# Patient Record
Sex: FEMALE | Race: WHITE | NOT HISPANIC OR LATINO | URBAN - METROPOLITAN AREA
[De-identification: names, ages, dates, MRNs, and addresses within clinical notes are randomized per-mention and may not be internally consistent; named-entity substitution may affect disease eponyms.]

---

## 2017-03-31 ENCOUNTER — HOSPITAL ENCOUNTER (OUTPATIENT)
Dept: CT IMAGING | Facility: HOSPITAL | Age: 47
Discharge: HOME | End: 2017-03-31
Admitting: PHYSICIAN ASSISTANT

## 2017-03-31 ENCOUNTER — HOSPITAL ENCOUNTER (OUTPATIENT)
Dept: CT IMAGING | Facility: HOSPITAL | Age: 47
Discharge: HOME | End: 2017-03-31

## 2017-03-31 DIAGNOSIS — R06.81 BREATHLESSNESS ON EXERTION: ICD-10-CM

## 2017-03-31 DIAGNOSIS — R10.11 RIGHT UPPER QUADRANT PAIN: ICD-10-CM

## 2017-03-31 PROCEDURE — 74150 CT ABDOMEN W/O CONTRAST: CPT

## 2017-03-31 PROCEDURE — 0 IOPAMIDOL PER 1 ML: Performed by: PHYSICIAN ASSISTANT

## 2017-03-31 PROCEDURE — 71260 CT THORAX DX C+: CPT

## 2017-03-31 RX ADMIN — IOPAMIDOL 90 ML: 755 INJECTION, SOLUTION INTRAVENOUS at 15:08

## 2017-07-05 ENCOUNTER — TRANSCRIBE ORDERS (OUTPATIENT)
Dept: ADMINISTRATIVE | Facility: HOSPITAL | Age: 47
End: 2017-07-05

## 2017-07-11 ENCOUNTER — TRANSCRIBE ORDERS (OUTPATIENT)
Dept: MAMMOGRAPHY | Facility: HOSPITAL | Age: 47
End: 2017-07-11

## 2017-07-11 DIAGNOSIS — N63.0 BREAST MASS: Primary | ICD-10-CM

## 2017-07-26 ENCOUNTER — TRANSCRIBE ORDERS (OUTPATIENT)
Dept: MAMMOGRAPHY | Facility: HOSPITAL | Age: 47
End: 2017-07-26

## 2017-07-26 ENCOUNTER — HOSPITAL ENCOUNTER (OUTPATIENT)
Dept: MAMMOGRAPHY | Facility: HOSPITAL | Age: 47
Discharge: HOME | End: 2017-07-26
Attending: OBSTETRICS & GYNECOLOGY | Admitting: OBSTETRICS & GYNECOLOGY

## 2017-07-26 ENCOUNTER — HOSPITAL ENCOUNTER (OUTPATIENT)
Dept: ULTRASOUND IMAGING | Facility: HOSPITAL | Age: 47
Discharge: HOME | End: 2017-07-26

## 2017-07-26 DIAGNOSIS — N63.0 BREAST MASS: ICD-10-CM

## 2017-07-26 DIAGNOSIS — R92.8 ABNORMAL MAMMOGRAM: Primary | ICD-10-CM

## 2017-07-26 PROCEDURE — G0279 TOMOSYNTHESIS, MAMMO: HCPCS

## 2017-07-26 PROCEDURE — 76642 ULTRASOUND BREAST LIMITED: CPT

## 2017-07-26 PROCEDURE — 77066 DX MAMMO INCL CAD BI: CPT | Performed by: RADIOLOGY

## 2017-07-26 PROCEDURE — G0204 DX MAMMO INCL CAD BI: HCPCS

## 2017-07-26 PROCEDURE — 77062 BREAST TOMOSYNTHESIS BI: CPT | Performed by: RADIOLOGY

## 2017-07-26 PROCEDURE — 76642 ULTRASOUND BREAST LIMITED: CPT | Performed by: RADIOLOGY

## 2018-02-01 ENCOUNTER — HOSPITAL ENCOUNTER (OUTPATIENT)
Dept: MAMMOGRAPHY | Facility: HOSPITAL | Age: 48
Discharge: HOME | End: 2018-02-01
Attending: OBSTETRICS & GYNECOLOGY | Admitting: OBSTETRICS & GYNECOLOGY

## 2018-02-01 DIAGNOSIS — R92.8 ABNORMAL MAMMOGRAM: ICD-10-CM

## 2018-02-01 PROCEDURE — 77062 BREAST TOMOSYNTHESIS BI: CPT | Performed by: RADIOLOGY

## 2018-02-01 PROCEDURE — 77066 DX MAMMO INCL CAD BI: CPT | Performed by: RADIOLOGY

## 2018-02-01 PROCEDURE — G0279 TOMOSYNTHESIS, MAMMO: HCPCS

## 2018-02-01 PROCEDURE — 77066 DX MAMMO INCL CAD BI: CPT

## 2019-01-21 ENCOUNTER — TRANSCRIBE ORDERS (OUTPATIENT)
Dept: ADMINISTRATIVE | Facility: HOSPITAL | Age: 49
End: 2019-01-21

## 2019-01-21 DIAGNOSIS — Z12.31 VISIT FOR SCREENING MAMMOGRAM: Primary | ICD-10-CM

## 2019-03-08 ENCOUNTER — HOSPITAL ENCOUNTER (OUTPATIENT)
Dept: MAMMOGRAPHY | Facility: HOSPITAL | Age: 49
Discharge: HOME | End: 2019-03-08
Attending: OBSTETRICS & GYNECOLOGY | Admitting: OBSTETRICS & GYNECOLOGY

## 2019-03-08 DIAGNOSIS — Z12.31 VISIT FOR SCREENING MAMMOGRAM: ICD-10-CM

## 2019-03-08 PROCEDURE — 77063 BREAST TOMOSYNTHESIS BI: CPT

## 2019-03-08 PROCEDURE — 77067 SCR MAMMO BI INCL CAD: CPT | Performed by: RADIOLOGY

## 2019-03-08 PROCEDURE — 77063 BREAST TOMOSYNTHESIS BI: CPT | Performed by: RADIOLOGY

## 2019-03-08 PROCEDURE — 77067 SCR MAMMO BI INCL CAD: CPT

## 2019-09-06 ENCOUNTER — OFFICE VISIT (OUTPATIENT)
Dept: INTERNAL MEDICINE | Facility: CLINIC | Age: 49
End: 2019-09-06

## 2019-09-06 VITALS
HEART RATE: 58 BPM | DIASTOLIC BLOOD PRESSURE: 58 MMHG | WEIGHT: 187 LBS | HEIGHT: 72 IN | BODY MASS INDEX: 25.33 KG/M2 | SYSTOLIC BLOOD PRESSURE: 100 MMHG | OXYGEN SATURATION: 99 %

## 2019-09-06 DIAGNOSIS — Z13.1 SCREENING FOR DIABETES MELLITUS: ICD-10-CM

## 2019-09-06 DIAGNOSIS — J30.0 VASOMOTOR RHINITIS: ICD-10-CM

## 2019-09-06 DIAGNOSIS — F45.8 BRUXISM: ICD-10-CM

## 2019-09-06 DIAGNOSIS — J32.9 CHRONIC SINUSITIS, UNSPECIFIED LOCATION: ICD-10-CM

## 2019-09-06 DIAGNOSIS — I34.1 MITRAL VALVE PROLAPSE: ICD-10-CM

## 2019-09-06 DIAGNOSIS — M25.551 PAIN OF RIGHT HIP JOINT: Primary | ICD-10-CM

## 2019-09-06 DIAGNOSIS — G43.909 MIGRAINE WITHOUT STATUS MIGRAINOSUS, NOT INTRACTABLE, UNSPECIFIED MIGRAINE TYPE: ICD-10-CM

## 2019-09-06 DIAGNOSIS — M85.852 OSTEOPENIA OF LEFT HIP: ICD-10-CM

## 2019-09-06 DIAGNOSIS — Z13.29 SCREENING FOR THYROID DISORDER: ICD-10-CM

## 2019-09-06 DIAGNOSIS — Z00.00 HEALTHCARE MAINTENANCE: ICD-10-CM

## 2019-09-06 DIAGNOSIS — E55.9 VITAMIN D DEFICIENCY: ICD-10-CM

## 2019-09-06 DIAGNOSIS — I87.1 MAY-THURNER SYNDROME: ICD-10-CM

## 2019-09-06 DIAGNOSIS — Z13.220 SCREENING, LIPID: ICD-10-CM

## 2019-09-06 LAB
ALBUMIN SERPL-MCNC: 4.5 G/DL (ref 3.5–5.2)
ALBUMIN/GLOB SERPL: 1.7 G/DL
ALP SERPL-CCNC: 56 U/L (ref 39–117)
ALT SERPL W P-5'-P-CCNC: 11 U/L (ref 1–33)
ANION GAP SERPL CALCULATED.3IONS-SCNC: 9.9 MMOL/L (ref 5–15)
AST SERPL-CCNC: 20 U/L (ref 1–32)
BILIRUB SERPL-MCNC: 0.4 MG/DL (ref 0.2–1.2)
BUN BLD-MCNC: 8 MG/DL (ref 6–20)
BUN/CREAT SERPL: 10 (ref 7–25)
CALCIUM SPEC-SCNC: 9.2 MG/DL (ref 8.6–10.5)
CHLORIDE SERPL-SCNC: 101 MMOL/L (ref 98–107)
CHOLEST SERPL-MCNC: 183 MG/DL (ref 0–200)
CO2 SERPL-SCNC: 28.1 MMOL/L (ref 22–29)
CREAT BLD-MCNC: 0.8 MG/DL (ref 0.57–1)
DEPRECATED RDW RBC AUTO: 43.8 FL (ref 37–54)
ERYTHROCYTE [DISTWIDTH] IN BLOOD BY AUTOMATED COUNT: 12.6 % (ref 12.3–15.4)
GFR SERPL CREATININE-BSD FRML MDRD: 77 ML/MIN/1.73
GLOBULIN UR ELPH-MCNC: 2.6 GM/DL
GLUCOSE BLD-MCNC: 76 MG/DL (ref 65–99)
HBA1C MFR BLD: 5.2 % (ref 4.8–5.6)
HCT VFR BLD AUTO: 45.1 % (ref 34–46.6)
HDLC SERPL-MCNC: 56 MG/DL (ref 40–60)
HGB BLD-MCNC: 14.4 G/DL (ref 12–15.9)
LDLC SERPL CALC-MCNC: 114 MG/DL (ref 0–100)
LDLC/HDLC SERPL: 2.04 {RATIO}
MCH RBC QN AUTO: 29.9 PG (ref 26.6–33)
MCHC RBC AUTO-ENTMCNC: 31.9 G/DL (ref 31.5–35.7)
MCV RBC AUTO: 93.6 FL (ref 79–97)
PLATELET # BLD AUTO: 130 10*3/MM3 (ref 140–450)
PMV BLD AUTO: 12.7 FL (ref 6–12)
POTASSIUM BLD-SCNC: 4.6 MMOL/L (ref 3.5–5.2)
PROT SERPL-MCNC: 7.1 G/DL (ref 6–8.5)
RBC # BLD AUTO: 4.82 10*6/MM3 (ref 3.77–5.28)
SODIUM BLD-SCNC: 139 MMOL/L (ref 136–145)
TRIGL SERPL-MCNC: 65 MG/DL (ref 0–150)
TSH SERPL DL<=0.05 MIU/L-ACNC: 0.92 UIU/ML (ref 0.27–4.2)
VLDLC SERPL-MCNC: 13 MG/DL (ref 5–40)
WBC NRBC COR # BLD: 4.67 10*3/MM3 (ref 3.4–10.8)

## 2019-09-06 PROCEDURE — 83036 HEMOGLOBIN GLYCOSYLATED A1C: CPT | Performed by: INTERNAL MEDICINE

## 2019-09-06 PROCEDURE — 85027 COMPLETE CBC AUTOMATED: CPT | Performed by: INTERNAL MEDICINE

## 2019-09-06 PROCEDURE — 99204 OFFICE O/P NEW MOD 45 MIN: CPT | Performed by: INTERNAL MEDICINE

## 2019-09-06 PROCEDURE — 82306 VITAMIN D 25 HYDROXY: CPT | Performed by: INTERNAL MEDICINE

## 2019-09-06 PROCEDURE — 80053 COMPREHEN METABOLIC PANEL: CPT | Performed by: INTERNAL MEDICINE

## 2019-09-06 PROCEDURE — 80061 LIPID PANEL: CPT | Performed by: INTERNAL MEDICINE

## 2019-09-06 PROCEDURE — 84443 ASSAY THYROID STIM HORMONE: CPT | Performed by: INTERNAL MEDICINE

## 2019-09-06 RX ORDER — IPRATROPIUM BROMIDE 21 UG/1
2 SPRAY, METERED NASAL DAILY PRN
COMMUNITY

## 2019-09-06 RX ORDER — MELOXICAM 15 MG/1
15 TABLET ORAL DAILY
Qty: 30 TABLET | Refills: 5 | Status: SHIPPED | OUTPATIENT
Start: 2019-09-06 | End: 2019-09-10 | Stop reason: SINTOL

## 2019-09-06 RX ORDER — LORATADINE 10 MG/1
10 TABLET ORAL EVERY OTHER DAY
COMMUNITY

## 2019-09-06 RX ORDER — SPIRONOLACTONE 50 MG/1
50 TABLET, FILM COATED ORAL 2 TIMES DAILY
Refills: 1 | COMMUNITY
Start: 2019-09-01

## 2019-09-06 NOTE — PROGRESS NOTES
Internal Medicine New Patient  Emiliana Garcia is a 49 y.o. female who presents today to establish care and with concerns as outlined below.    Chief Complaint  Chief Complaint   Patient presents with   • Establish Care   • Hip Pain        HPI  She is a 49y F with May-Thurner syndrome causing RLE swelling, mitral valve prolapse, patello femoral syndrome, sinusitis, vasomotor rhinitis, osteopenia, misophonia who presents today to establish care and have annual exam. She was previously a patient of Dr. Mead. Also sees Dr. Beckford for ENT, Dr. Hathaway for OB/GYN, Dr. Cooley for dermatology.    She walks half marathons twice a year, walks most days of the week at least 4 miles when training for the half marathon. Also has gym equipment at home but not routinely used.    She has constant right hip pain for the past 1.5 years, worse with prolonged sitting and better with walking. Works at desk job. Pain is described as a pressure like aching in the back of the hip, feels as if the hip is displaced. Had an episode of back pain after the [employer] [place] about 6 months ago. Saw a chiropractor a few times which helped the back pain but hip pain was not relieved. Did try stretches at home. Is sleeping with pillow between her legs but still waking up with hip pain. No weakness. Has tried flexeril prescribed by last PCP which makes her sleep but did not necessarily help her pain.         Review of Systems  Review of Systems   Constitutional: Positive for fatigue. Negative for appetite change, chills, diaphoresis, fever, unexpected weight gain and unexpected weight loss.   HENT: Negative for ear pain, hearing loss and sore throat.    Eyes: Positive for redness. Negative for visual disturbance.   Respiratory: Negative for cough, shortness of breath and wheezing.    Cardiovascular: Positive for leg swelling. Negative for chest pain and palpitations.   Gastrointestinal: Negative for abdominal pain, blood in stool, constipation,  diarrhea, nausea, vomiting and GERD.   Endocrine: Positive for heat intolerance and polydipsia. Negative for polyuria.   Genitourinary: Negative for decreased urine volume, difficulty urinating, dysuria, frequency, hematuria and urgency.   Musculoskeletal: Positive for arthralgias and myalgias. Negative for gait problem and joint swelling.   Skin: Negative for rash.   Neurological: Positive for headache. Negative for weakness and numbness.        Past Medical History  Past Medical History:   Diagnosis Date   • Fractures    • May-Thurner syndrome    • Migraine    • Mitral valve prolapse    • Osteopenia    • Vasomotor rhinitis         Surgical History  Past Surgical History:   Procedure Laterality Date   • AUGMENTATION MAMMAPLASTY      2012   • WRIST FRACTURE SURGERY Left 2016        Family History  Family History   Problem Relation Age of Onset   • Hypertension Father    • Alzheimer's disease Father    • Prostate cancer Father    • Skin cancer Father         melanoma   • Thyroid disease Brother    • Osteoporosis Maternal Grandmother    • Alzheimer's disease Maternal Grandmother    • Stroke Maternal Grandfather    • Heart attack Paternal Grandmother    • Hypertension Paternal Grandmother    • Lupus Mother         skin involvement   • Lupus Sister    • Breast cancer Neg Hx    • Ovarian cancer Neg Hx         Social History  Social History     Socioeconomic History   • Marital status:      Spouse name: Not on file   • Number of children: Not on file   • Years of education: Not on file   • Highest education level: Not on file   Tobacco Use   • Smoking status: Never Smoker   • Smokeless tobacco: Never Used   Substance and Sexual Activity   • Alcohol use: No     Frequency: Never   • Drug use: No        Current Medications  Current Outpatient Medications on File Prior to Visit   Medication Sig Dispense Refill   • Cholecalciferol (VITAMIN D PO) Take 2,000 Units by mouth 2 (Two) Times a Day.     •  "Glucosamine-Chondroitin (GLUCOSAMINE CHONDR COMPLEX PO) Take 1 tablet by mouth Daily.     • ipratropium (ATROVENT) 0.03 % nasal spray 2 sprays into the nostril(s) as directed by provider Daily As Needed.     • loratadine (CLARITIN) 10 MG tablet Take 10 mg by mouth Every Other Day.     • Montelukast Sodium (SINGULAIR PO) Take 10 mg by mouth Every Other Day.     • MULTIPLE VITAMIN PO Multiple Vitamin capsule     • NASAL SALINE NA into the nostril(s) as directed by provider.     • spironolactone (ALDACTONE) 50 MG tablet   1     No current facility-administered medications on file prior to visit.        Allergies  Allergies   Allergen Reactions   • Bactrim [Sulfamethoxazole-Trimethoprim] Itching     All over itching, chills, jittery   • Erythromycin Other (See Comments)     Stomach pains, rash   • Lorabid [Loracarbef] Other (See Comments)     Stomach pains, rash     • Penicillins Other (See Comments)     Allergy unknown, happened as a child        Objective  Visit Vitals  /58 (BP Location: Left arm, Patient Position: Sitting)   Pulse 58   Ht 185.4 cm (73\")   Wt 84.8 kg (187 lb)   SpO2 99%   BMI 24.67 kg/m²        Physical Exam  Physical Exam   Constitutional: She is oriented to person, place, and time. She appears well-developed and well-nourished. No distress.   Tall and fit.   HENT:   Head: Normocephalic and atraumatic.   Right Ear: External ear normal.   Left Ear: External ear normal.   Nose: Nose normal.   Mouth/Throat: Oropharynx is clear and moist. No oropharyngeal exudate.   Eyes: Conjunctivae and EOM are normal. Pupils are equal, round, and reactive to light. No scleral icterus.   Neck: Neck supple.   Cardiovascular: Normal rate, regular rhythm, normal heart sounds and intact distal pulses.   No murmur (unable to appreciate mitral valve prolapse) heard.  Pulmonary/Chest: Effort normal and breath sounds normal. No respiratory distress. She has no wheezes.   Abdominal: Soft. Bowel sounds are normal. She " exhibits no distension. There is no tenderness.   Musculoskeletal: She exhibits tenderness (over palpation of right greater trochanter and SI joint.). She exhibits no edema or deformity.   Increase in right hip pain with abduction of hip. Negative straight leg raise. Strength intact.   Lymphadenopathy:     She has no cervical adenopathy.   Neurological: She is alert and oriented to person, place, and time.   Skin: Skin is warm and dry. No rash noted. She is not diaphoretic.   Psychiatric: She has a normal mood and affect. Her behavior is normal. Judgment and thought content normal.   Nursing note and vitals reviewed.       Results  Results for orders placed or performed in visit on 09/06/19   Comprehensive Metabolic Panel   Result Value Ref Range    Glucose 76 65 - 99 mg/dL    BUN 8 6 - 20 mg/dL    Creatinine 0.80 0.57 - 1.00 mg/dL    Sodium 139 136 - 145 mmol/L    Potassium 4.6 3.5 - 5.2 mmol/L    Chloride 101 98 - 107 mmol/L    CO2 28.1 22.0 - 29.0 mmol/L    Calcium 9.2 8.6 - 10.5 mg/dL    Total Protein 7.1 6.0 - 8.5 g/dL    Albumin 4.50 3.50 - 5.20 g/dL    ALT (SGPT) 11 1 - 33 U/L    AST (SGOT) 20 1 - 32 U/L    Alkaline Phosphatase 56 39 - 117 U/L    Total Bilirubin 0.4 0.2 - 1.2 mg/dL    eGFR Non African Amer 77 >60 mL/min/1.73    Globulin 2.6 gm/dL    A/G Ratio 1.7 g/dL    BUN/Creatinine Ratio 10.0 7.0 - 25.0    Anion Gap 9.9 5.0 - 15.0 mmol/L   CBC (No Diff)   Result Value Ref Range    WBC 4.67 3.40 - 10.80 10*3/mm3    RBC 4.82 3.77 - 5.28 10*6/mm3    Hemoglobin 14.4 12.0 - 15.9 g/dL    Hematocrit 45.1 34.0 - 46.6 %    MCV 93.6 79.0 - 97.0 fL    MCH 29.9 26.6 - 33.0 pg    MCHC 31.9 31.5 - 35.7 g/dL    RDW 12.6 12.3 - 15.4 %    RDW-SD 43.8 37.0 - 54.0 fl    MPV 12.7 (H) 6.0 - 12.0 fL    Platelets 130 (L) 140 - 450 10*3/mm3   TSH Rfx On Abnormal To Free T4   Result Value Ref Range    TSH 0.923 0.270 - 4.200 uIU/mL   Lipid Panel   Result Value Ref Range    Total Cholesterol 183 0 - 200 mg/dL    Triglycerides 65 0  - 150 mg/dL    HDL Cholesterol 56 40 - 60 mg/dL    LDL Cholesterol  114 (H) 0 - 100 mg/dL    VLDL Cholesterol 13 5 - 40 mg/dL    LDL/HDL Ratio 2.04    Vitamin D 25 Hydroxy   Result Value Ref Range    25 Hydroxy, Vitamin D >120.0 (H) 30.0 - 100.0 ng/ml   Hemoglobin A1c   Result Value Ref Range    Hemoglobin A1C 5.20 4.80 - 5.60 %        Assessment and Plan  Emiliana was seen today for establish care and hip pain.    Diagnoses and all orders for this visit:    Pain of right hip joint  - Exam with tenderness to palpation over great trochanteric bursa as well as SI joint, increased pain with abduction, negative straight leg raise, intact strength  - Given hx of May-Thurner Syndrome considered claudication due to aortoiliac insufficiency but pain improves with activity and worse with sitting  - Most likely some component of bursitis from prolonged sitting at work which would explain exam except for SI tenderness which may be OA  - Will treat with short term meloxicam, PT, avoidance of prolonged sitting and cushioning when sitting  - If no improvement with conservative treatment will consider imaging of hip    Osteopenia of left hip  - Last BMD 11/2017 with osteopenia and worsening of T score at left hip to -2.4, FRAX 6.4% and 1.4%.  - On vit D and MVI  - Repeat DEXA ordered    Migraine without status migrainosus, not intractable, unspecified migraine type  - No current concerns, well controlled with PRN medications    Mitral valve prolapse  - Review of outside records revealed mild mitral valve prolapse  - No symptoms currently  - Not followed with regular echos and given mild nature, asymptomatic will defer at this time but consider at next visit    May-Thurner syndrome  - Affecting LLE, no history of DVT and no signs of clot today  - Continue to monitor    Vasomotor rhinitis  - Follows with ENT and on ipratropium nasal spray    Chronic sinusitis, unspecified location  - Follows with an ENT and takes claritin, singulair,  nasal saline    Bruxism  - Uses bite guard provided by her dentist    Vitamin D deficiency  - Vitamin D level checked, elevated without evidence of hypercalcemia or liver injury  - Will advise her to hold her vitamin D supplement and repeat testing in 3 months    Healthcare maintenance  - CBC and CMP    Screening for thyroid disorder  - TSH normal    Screening, lipid  - Lipids ordered    Screening for diabetes mellitus  - A1c ordered    Health Maintenance   Topic Date Due   • ANNUAL PHYSICAL  08/13/1975   • INFLUENZA VACCINE  08/01/2019   • PAP SMEAR  09/06/2019   • DXA SCAN  09/06/2019   • TDAP/TD VACCINES (2 - Td) 10/10/2022     Health Maintenance  - Pap smear: Last 2/2019 at GYN, ASCUS HPV negative. Repeat cotesting in 3 years.  - Mammogram: 3/2019 BIRADS 2  - Colonoscopy: Not yet indicated.  - Immunizations: Declined flu vaccine today. Tdap UTD.    Return in about 1 year (around 9/6/2020) for Annual.

## 2019-09-07 LAB — 25(OH)D3 SERPL-MCNC: >120 NG/ML (ref 30–100)

## 2019-09-09 PROBLEM — I34.1 MITRAL VALVE PROLAPSE: Status: ACTIVE | Noted: 2019-09-09

## 2019-09-09 PROBLEM — E55.9 VITAMIN D DEFICIENCY: Status: ACTIVE | Noted: 2019-09-09

## 2019-09-09 PROBLEM — I87.1 MAY-THURNER SYNDROME: Status: ACTIVE | Noted: 2019-09-09

## 2019-09-09 PROBLEM — M25.551 PAIN OF RIGHT HIP JOINT: Status: ACTIVE | Noted: 2019-09-09

## 2019-09-09 PROBLEM — F45.8 BRUXISM: Status: ACTIVE | Noted: 2019-09-09

## 2019-09-09 PROBLEM — M85.852 OSTEOPENIA OF LEFT HIP: Status: ACTIVE | Noted: 2019-09-09

## 2019-09-09 PROBLEM — G43.909 MIGRAINE WITHOUT STATUS MIGRAINOSUS, NOT INTRACTABLE: Status: ACTIVE | Noted: 2019-09-09

## 2019-09-09 PROBLEM — J32.9 CHRONIC SINUSITIS: Status: ACTIVE | Noted: 2019-09-09

## 2019-09-09 PROBLEM — D69.6 THROMBOCYTOPENIA (HCC): Status: ACTIVE | Noted: 2019-09-09

## 2019-09-09 PROBLEM — J30.0 VASOMOTOR RHINITIS: Status: ACTIVE | Noted: 2019-09-09

## 2019-09-10 ENCOUNTER — TELEPHONE (OUTPATIENT)
Dept: INTERNAL MEDICINE | Facility: CLINIC | Age: 49
End: 2019-09-10

## 2019-09-10 NOTE — TELEPHONE ENCOUNTER
----- Message from Saloni Hall MD sent at 9/9/2019  9:23 AM EDT -----  Please call Ms. Garcia and let her know that her labs show an elevated vitamin D level. It likely has not caused her any harm but is something that we need to correct. She should stop her daily supplement for now and we will recheck it in 3 months. She can return to the clinic to get the lab but does not need an appointment to see me. Also her platelets are a little low but looking at her records from Dr. Sanchez this appears to be an issue she has had in the past and it is stable.

## 2019-09-10 NOTE — TELEPHONE ENCOUNTER
Called patient to notify her of your message. She states that she has a skin rash on the back of her neck along the hair line and fells it is a side effect from the Meloxicam. She has quit taking the medication and wants to know if there is another medication she can try.    Please avdise

## 2019-09-11 NOTE — TELEPHONE ENCOUNTER
There are other medications in the same class as meloxicam (NSAIDs) but they may result in the same rash. Has she taken ibuprofen before without rash? If she would rather avoid another systemic NSAID we could try a topical NSAID applied locally to the area. Let me know if she would like to try this and I will order it. Thank you!

## 2019-10-28 ENCOUNTER — HOSPITAL ENCOUNTER (OUTPATIENT)
Dept: BONE DENSITY | Facility: HOSPITAL | Age: 49
Discharge: HOME | End: 2019-10-28
Admitting: INTERNAL MEDICINE

## 2019-10-28 DIAGNOSIS — M85.852 OSTEOPENIA OF LEFT HIP: ICD-10-CM

## 2019-10-28 PROCEDURE — 77080 DXA BONE DENSITY AXIAL: CPT

## 2019-11-26 ENCOUNTER — APPOINTMENT (OUTPATIENT)
Dept: BONE DENSITY | Facility: HOSPITAL | Age: 49
End: 2019-11-26

## 2019-12-09 ENCOUNTER — TELEPHONE (OUTPATIENT)
Dept: INTERNAL MEDICINE | Facility: CLINIC | Age: 49
End: 2019-12-09

## 2019-12-09 NOTE — TELEPHONE ENCOUNTER
PT CALLED TO SAY THAT SHE IS SUPPOSED TO COME IN FOR LABS BUT THERE IS NO ORDER IN HER CHART    PLEASE PUT ORDER IN CHART AND CALL THE PT   PTS NUMBER [id number]

## 2019-12-10 DIAGNOSIS — D69.6 THROMBOCYTOPENIA (HCC): ICD-10-CM

## 2019-12-10 DIAGNOSIS — E67.3 HYPERVITAMINOSIS D: Primary | ICD-10-CM

## 2019-12-12 ENCOUNTER — LAB (OUTPATIENT)
Dept: INTERNAL MEDICINE | Facility: CLINIC | Age: 49
End: 2019-12-12

## 2019-12-12 DIAGNOSIS — D69.6 THROMBOCYTOPENIA (HCC): ICD-10-CM

## 2019-12-12 DIAGNOSIS — E67.3 HYPERVITAMINOSIS D: ICD-10-CM

## 2019-12-12 LAB
25(OH)D3 SERPL-MCNC: 56 NG/ML (ref 30–100)
BASOPHILS # BLD AUTO: 0.01 10*3/MM3 (ref 0–0.2)
BASOPHILS NFR BLD AUTO: 0.2 % (ref 0–1.5)
DEPRECATED RDW RBC AUTO: 41.5 FL (ref 37–54)
EOSINOPHIL # BLD AUTO: 0.05 10*3/MM3 (ref 0–0.4)
EOSINOPHIL NFR BLD AUTO: 1.2 % (ref 0.3–6.2)
ERYTHROCYTE [DISTWIDTH] IN BLOOD BY AUTOMATED COUNT: 12.6 % (ref 12.3–15.4)
HCT VFR BLD AUTO: 42.3 % (ref 34–46.6)
HGB BLD-MCNC: 14 G/DL (ref 12–15.9)
IMM GRANULOCYTES # BLD AUTO: 0.01 10*3/MM3 (ref 0–0.05)
IMM GRANULOCYTES NFR BLD AUTO: 0.2 % (ref 0–0.5)
LYMPHOCYTES # BLD AUTO: 1.22 10*3/MM3 (ref 0.7–3.1)
LYMPHOCYTES NFR BLD AUTO: 29.3 % (ref 19.6–45.3)
MCH RBC QN AUTO: 29.9 PG (ref 26.6–33)
MCHC RBC AUTO-ENTMCNC: 33.1 G/DL (ref 31.5–35.7)
MCV RBC AUTO: 90.2 FL (ref 79–97)
MONOCYTES # BLD AUTO: 0.35 10*3/MM3 (ref 0.1–0.9)
MONOCYTES NFR BLD AUTO: 8.4 % (ref 5–12)
NEUTROPHILS # BLD AUTO: 2.53 10*3/MM3 (ref 1.7–7)
NEUTROPHILS NFR BLD AUTO: 60.7 % (ref 42.7–76)
NRBC BLD AUTO-RTO: 0 /100 WBC (ref 0–0.2)
PLATELET # BLD AUTO: 162 10*3/MM3 (ref 140–450)
PMV BLD AUTO: 12 FL (ref 6–12)
RBC # BLD AUTO: 4.69 10*6/MM3 (ref 3.77–5.28)
WBC NRBC COR # BLD: 4.17 10*3/MM3 (ref 3.4–10.8)

## 2019-12-12 PROCEDURE — 85025 COMPLETE CBC W/AUTO DIFF WBC: CPT | Performed by: INTERNAL MEDICINE

## 2019-12-12 PROCEDURE — 82306 VITAMIN D 25 HYDROXY: CPT | Performed by: INTERNAL MEDICINE

## 2020-02-13 ENCOUNTER — TRANSCRIBE ORDERS (OUTPATIENT)
Dept: ADMINISTRATIVE | Facility: HOSPITAL | Age: 50
End: 2020-02-13

## 2020-02-13 DIAGNOSIS — Z12.31 VISIT FOR SCREENING MAMMOGRAM: Primary | ICD-10-CM

## 2020-02-28 ENCOUNTER — LAB (OUTPATIENT)
Dept: LAB | Facility: HOSPITAL | Age: 50
End: 2020-02-28

## 2020-02-28 ENCOUNTER — TRANSCRIBE ORDERS (OUTPATIENT)
Dept: LAB | Facility: HOSPITAL | Age: 50
End: 2020-02-28

## 2020-02-28 DIAGNOSIS — N95.1 SYMPTOMATIC MENOPAUSAL OR FEMALE CLIMACTERIC STATES: Primary | ICD-10-CM

## 2020-02-28 DIAGNOSIS — N95.1 SYMPTOMATIC MENOPAUSAL OR FEMALE CLIMACTERIC STATES: ICD-10-CM

## 2020-02-28 LAB
ESTRADIOL SERPL HS-MCNC: 23 PG/ML
FSH SERPL-ACNC: 45.6 MIU/ML
PROGEST SERPL-MCNC: 0.15 NG/ML
TESTOST SERPL-MCNC: 4.88 NG/DL (ref 8.4–48.1)
TSH SERPL DL<=0.05 MIU/L-ACNC: 0.75 UIU/ML (ref 0.27–4.2)

## 2020-02-28 PROCEDURE — 84144 ASSAY OF PROGESTERONE: CPT

## 2020-02-28 PROCEDURE — 84403 ASSAY OF TOTAL TESTOSTERONE: CPT

## 2020-02-28 PROCEDURE — 82670 ASSAY OF TOTAL ESTRADIOL: CPT

## 2020-02-28 PROCEDURE — 83001 ASSAY OF GONADOTROPIN (FSH): CPT

## 2020-02-28 PROCEDURE — 36415 COLL VENOUS BLD VENIPUNCTURE: CPT

## 2020-02-28 PROCEDURE — 84443 ASSAY THYROID STIM HORMONE: CPT

## 2020-04-27 ENCOUNTER — APPOINTMENT (OUTPATIENT)
Dept: MAMMOGRAPHY | Facility: HOSPITAL | Age: 50
End: 2020-04-27

## 2020-07-15 ENCOUNTER — HOSPITAL ENCOUNTER (OUTPATIENT)
Dept: MAMMOGRAPHY | Facility: HOSPITAL | Age: 50
Discharge: HOME | End: 2020-07-15
Admitting: OBSTETRICS & GYNECOLOGY

## 2020-07-15 DIAGNOSIS — Z12.31 VISIT FOR SCREENING MAMMOGRAM: ICD-10-CM

## 2020-07-15 PROCEDURE — 77063 BREAST TOMOSYNTHESIS BI: CPT

## 2020-07-15 PROCEDURE — 77067 SCR MAMMO BI INCL CAD: CPT

## 2020-07-15 PROCEDURE — 77063 BREAST TOMOSYNTHESIS BI: CPT | Performed by: RADIOLOGY

## 2020-07-15 PROCEDURE — 77067 SCR MAMMO BI INCL CAD: CPT | Performed by: RADIOLOGY

## 2020-09-08 ENCOUNTER — LAB (OUTPATIENT)
Dept: LAB | Facility: HOSPITAL | Age: 50
End: 2020-09-08

## 2020-09-08 ENCOUNTER — OFFICE VISIT (OUTPATIENT)
Dept: INTERNAL MEDICINE | Facility: CLINIC | Age: 50
End: 2020-09-08

## 2020-09-08 VITALS
TEMPERATURE: 96.9 F | BODY MASS INDEX: 25.95 KG/M2 | HEART RATE: 69 BPM | SYSTOLIC BLOOD PRESSURE: 124 MMHG | DIASTOLIC BLOOD PRESSURE: 82 MMHG | HEIGHT: 72 IN | OXYGEN SATURATION: 99 % | WEIGHT: 191.6 LBS

## 2020-09-08 DIAGNOSIS — E55.9 VITAMIN D DEFICIENCY: ICD-10-CM

## 2020-09-08 DIAGNOSIS — Z00.00 ANNUAL PHYSICAL EXAM: Primary | ICD-10-CM

## 2020-09-08 DIAGNOSIS — I34.1 MITRAL VALVE PROLAPSE: ICD-10-CM

## 2020-09-08 DIAGNOSIS — J30.0 VASOMOTOR RHINITIS: ICD-10-CM

## 2020-09-08 DIAGNOSIS — Z00.00 ANNUAL PHYSICAL EXAM: ICD-10-CM

## 2020-09-08 DIAGNOSIS — E78.2 MODERATE MIXED HYPERLIPIDEMIA NOT REQUIRING STATIN THERAPY: ICD-10-CM

## 2020-09-08 DIAGNOSIS — M85.852 OSTEOPENIA OF LEFT HIP: ICD-10-CM

## 2020-09-08 DIAGNOSIS — J32.9 CHRONIC SINUSITIS, UNSPECIFIED LOCATION: ICD-10-CM

## 2020-09-08 DIAGNOSIS — I87.1 MAY-THURNER SYNDROME: ICD-10-CM

## 2020-09-08 PROBLEM — F45.8 BRUXISM: Status: RESOLVED | Noted: 2019-09-09 | Resolved: 2020-09-08

## 2020-09-08 PROBLEM — M25.551 PAIN OF RIGHT HIP JOINT: Status: RESOLVED | Noted: 2019-09-09 | Resolved: 2020-09-08

## 2020-09-08 LAB
25(OH)D3 SERPL-MCNC: 51.5 NG/ML (ref 30–100)
ALBUMIN SERPL-MCNC: 4.3 G/DL (ref 3.5–5.2)
ALBUMIN/GLOB SERPL: 1.5 G/DL
ALP SERPL-CCNC: 65 U/L (ref 39–117)
ALT SERPL W P-5'-P-CCNC: 10 U/L (ref 1–33)
ANION GAP SERPL CALCULATED.3IONS-SCNC: 6.6 MMOL/L (ref 5–15)
AST SERPL-CCNC: 14 U/L (ref 1–32)
BILIRUB SERPL-MCNC: 0.6 MG/DL (ref 0–1.2)
BUN SERPL-MCNC: 10 MG/DL (ref 6–20)
BUN/CREAT SERPL: 12.3 (ref 7–25)
CALCIUM SPEC-SCNC: 9.6 MG/DL (ref 8.6–10.5)
CHLORIDE SERPL-SCNC: 103 MMOL/L (ref 98–107)
CHOLEST SERPL-MCNC: 172 MG/DL (ref 0–200)
CO2 SERPL-SCNC: 27.4 MMOL/L (ref 22–29)
CREAT SERPL-MCNC: 0.81 MG/DL (ref 0.57–1)
DEPRECATED RDW RBC AUTO: 39.4 FL (ref 37–54)
ERYTHROCYTE [DISTWIDTH] IN BLOOD BY AUTOMATED COUNT: 12.2 % (ref 12.3–15.4)
GFR SERPL CREATININE-BSD FRML MDRD: 75 ML/MIN/1.73
GLOBULIN UR ELPH-MCNC: 2.9 GM/DL
GLUCOSE SERPL-MCNC: 83 MG/DL (ref 65–99)
HBA1C MFR BLD: 4.7 % (ref 4.8–5.6)
HCT VFR BLD AUTO: 40.6 % (ref 34–46.6)
HCV AB SER DONR QL: NORMAL
HDLC SERPL-MCNC: 56 MG/DL (ref 40–60)
HGB BLD-MCNC: 14.2 G/DL (ref 12–15.9)
LDLC SERPL CALC-MCNC: 99 MG/DL (ref 0–100)
LDLC/HDLC SERPL: 1.76 {RATIO}
MCH RBC QN AUTO: 30.9 PG (ref 26.6–33)
MCHC RBC AUTO-ENTMCNC: 35 G/DL (ref 31.5–35.7)
MCV RBC AUTO: 88.5 FL (ref 79–97)
PLATELET # BLD AUTO: 143 10*3/MM3 (ref 140–450)
PMV BLD AUTO: 12.2 FL (ref 6–12)
POTASSIUM SERPL-SCNC: 4.3 MMOL/L (ref 3.5–5.2)
PROT SERPL-MCNC: 7.2 G/DL (ref 6–8.5)
RBC # BLD AUTO: 4.59 10*6/MM3 (ref 3.77–5.28)
SODIUM SERPL-SCNC: 137 MMOL/L (ref 136–145)
TRIGL SERPL-MCNC: 86 MG/DL (ref 0–150)
VLDLC SERPL-MCNC: 17.2 MG/DL (ref 5–40)
WBC # BLD AUTO: 4.59 10*3/MM3 (ref 3.4–10.8)

## 2020-09-08 PROCEDURE — 80061 LIPID PANEL: CPT | Performed by: INTERNAL MEDICINE

## 2020-09-08 PROCEDURE — 85027 COMPLETE CBC AUTOMATED: CPT | Performed by: INTERNAL MEDICINE

## 2020-09-08 PROCEDURE — 86803 HEPATITIS C AB TEST: CPT | Performed by: INTERNAL MEDICINE

## 2020-09-08 PROCEDURE — 80053 COMPREHEN METABOLIC PANEL: CPT | Performed by: INTERNAL MEDICINE

## 2020-09-08 PROCEDURE — 83036 HEMOGLOBIN GLYCOSYLATED A1C: CPT | Performed by: INTERNAL MEDICINE

## 2020-09-08 PROCEDURE — 82306 VITAMIN D 25 HYDROXY: CPT | Performed by: INTERNAL MEDICINE

## 2020-09-08 PROCEDURE — 99396 PREV VISIT EST AGE 40-64: CPT | Performed by: INTERNAL MEDICINE

## 2020-10-26 ENCOUNTER — E-VISIT (OUTPATIENT)
Dept: FAMILY MEDICINE CLINIC | Facility: TELEHEALTH | Age: 50
End: 2020-10-26

## 2020-10-26 PROCEDURE — 99422 OL DIG E/M SVC 11-20 MIN: CPT | Performed by: NURSE PRACTITIONER

## 2020-10-26 RX ORDER — PHENAZOPYRIDINE HYDROCHLORIDE 100 MG/1
100 TABLET, FILM COATED ORAL 3 TIMES DAILY PRN
Qty: 6 TABLET | Refills: 0 | Status: SHIPPED | OUTPATIENT
Start: 2020-10-26 | End: 2020-10-28

## 2020-10-26 RX ORDER — NITROFURANTOIN 25; 75 MG/1; MG/1
100 CAPSULE ORAL 2 TIMES DAILY
Qty: 10 CAPSULE | Refills: 0 | Status: SHIPPED | OUTPATIENT
Start: 2020-10-26 | End: 2020-10-31

## 2020-10-27 NOTE — PROGRESS NOTES
"Emiliana Garcia    1970  [id number]    I have reviewed the e-Visit questionnaire and patient's answers, my assessment and plan are as follows:    Questionnaire:     --Symptoms: No  --In the last 14 day, have you had contact with anyone who is ill, has shown any of the symptoms listed above and/or has been diagnosed with 2019 Novel Coronavirus? This includes any immediate household member but excludes any patients with whom you have been in contact within your normal work duties wearing proper PPE, if you are a healthcare worker: No    LMP: 10/15/202    HPI: Emiliana Garcia is a 51 yo female woh complains is urinary tract infection symptoms. She has had them from 1-4 days and does not reports any over the counter or home remedies for her symptoms. She has had symptoms in the past and treatment with \"pills for urine infection\" Cranberry juice have helped.       Review of Systems - Genito-Urinary ROS: positive for - dysuria, urinary frequency/urgency and feeling like she has to go and not emptying her bladder   negative for - genital discharge, genital ulcers, hematuria, nocturia or vulvar/vaginal symptoms or fever       There are no diagnoses linked to this encounter.    Any medications prescribed have been sent electronically to   [id number] - [place], [place] - [address] AT [facility] - [phone]  - [phone] [address]  [facility] [address]  Phone: [phone] Fax: [phone]      Time Documentation  Counseled patient  Counseling topics: diagnosis, treatment options, follow up plan and return instructions  Total encounter time:12 minutes         Ludmila De Jesus, RONNI  10/26/20  20:15 EDT          "

## 2020-10-27 NOTE — PATIENT INSTRUCTIONS
Drink plenty of fluids   Since you are taking spironolactone which is a diuretic, you may need to make sure you are drinking adequate fluids. The would be indicated by light yellow urine. It will be hard to determine when you take pyridium (phenazopyridine) as this makes your urine orange and can stain your clothing, floor and commode. This is for your bladder or urethral spasms which can cause the feeling of incomplete emptying.   If your symptoms do no improve in 3-5 days or worsen at anytime. If you continue to feel you are not emptying, go to urgent care or  your primary care provider for an in person evaluation.       Urinary Tract Infection, Adult  A urinary tract infection (UTI) is an infection of any part of the urinary tract. The urinary tract includes:  · The kidneys.  · The ureters.  · The bladder.  · The urethra.  These organs make, store, and get rid of pee (urine) in the body.  What are the causes?  This is caused by germs (bacteria) in your genital area. These germs grow and cause swelling (inflammation) of your urinary tract.  What increases the risk?  You are more likely to develop this condition if:  · You have a small, thin tube (catheter) to drain pee.  · You cannot control when you pee or poop (incontinence).  · You are female, and:  ? You use these methods to prevent pregnancy:  ? A medicine that kills sperm (spermicide).  ? A device that blocks sperm (diaphragm).  ? You have low levels of a female hormone (estrogen).  ? You are pregnant.  · You have genes that add to your risk.  · You are sexually active.  · You take antibiotic medicines.  · You have trouble peeing because of:  ? A prostate that is bigger than normal, if you are male.  ? A blockage in the part of your body that drains pee from the bladder (urethra).  ? A kidney stone.  ? A nerve condition that affects your bladder (neurogenic bladder).  ? Not getting enough to drink.  ? Not peeing often enough.  · You have other conditions,  such as:  ? Diabetes.  ? A weak disease-fighting system (immune system).  ? Sickle cell disease.  ? Gout.  ? Injury of the spine.  What are the signs or symptoms?  Symptoms of this condition include:  · Needing to pee right away (urgently).  · Peeing often.  · Peeing small amounts often.  · Pain or burning when peeing.  · Blood in the pee.  · Pee that smells bad or not like normal.  · Trouble peeing.  · Pee that is cloudy.  · Fluid coming from the vagina, if you are female.  · Pain in the belly or lower back.  Other symptoms include:  · Throwing up (vomiting).  · No urge to eat.  · Feeling mixed up (confused).  · Being tired and grouchy (irritable).  · A fever.  · Watery poop (diarrhea).  How is this treated?  This condition may be treated with:  · Antibiotic medicine.  · Other medicines.  · Drinking enough water.  Follow these instructions at home:    Medicines  · Take over-the-counter and prescription medicines only as told by your doctor.  · If you were prescribed an antibiotic medicine, take it as told by your doctor. Do not stop taking it even if you start to feel better.  General instructions  · Make sure you:  ? Pee until your bladder is empty.  ? Do not hold pee for a long time.  ? Empty your bladder after sex.  ? Wipe from front to back after pooping if you are a female. Use each tissue one time when you wipe.  · Drink enough fluid to keep your pee pale yellow.  · Keep all follow-up visits as told by your doctor. This is important.  Contact a doctor if:  · You do not get better after 1-2 days.  · Your symptoms go away and then come back.  Get help right away if:  · You have very bad back pain.  · You have very bad pain in your lower belly.  · You have a fever.  · You are sick to your stomach (nauseous).  · You are throwing up.  Summary  · A urinary tract infection (UTI) is an infection of any part of the urinary tract.  · This condition is caused by germs in your genital area.  · There are many risk factors  for a UTI. These include having a small, thin tube to drain pee and not being able to control when you pee or poop.  · Treatment includes antibiotic medicines for germs.  · Drink enough fluid to keep your pee pale yellow.  This information is not intended to replace advice given to you by your health care provider. Make sure you discuss any questions you have with your health care provider.  Document Released: 06/05/2009 Document Revised: 12/05/2019 Document Reviewed: 06/27/2019  Elsevier Patient Education © 2020 [employer]

## 2021-06-01 ENCOUNTER — TRANSCRIBE ORDERS (OUTPATIENT)
Dept: ADMINISTRATIVE | Facility: HOSPITAL | Age: 51
End: 2021-06-01

## 2021-06-01 DIAGNOSIS — Z12.31 VISIT FOR SCREENING MAMMOGRAM: Primary | ICD-10-CM

## 2021-07-28 ENCOUNTER — HOSPITAL ENCOUNTER (OUTPATIENT)
Dept: MAMMOGRAPHY | Facility: HOSPITAL | Age: 51
Discharge: HOME | End: 2021-07-28
Admitting: OBSTETRICS & GYNECOLOGY

## 2021-07-28 DIAGNOSIS — Z12.31 VISIT FOR SCREENING MAMMOGRAM: ICD-10-CM

## 2021-07-28 PROCEDURE — 77067 SCR MAMMO BI INCL CAD: CPT | Performed by: RADIOLOGY

## 2021-07-28 PROCEDURE — 77063 BREAST TOMOSYNTHESIS BI: CPT

## 2021-07-28 PROCEDURE — 77067 SCR MAMMO BI INCL CAD: CPT

## 2021-07-28 PROCEDURE — 77063 BREAST TOMOSYNTHESIS BI: CPT | Performed by: RADIOLOGY

## 2021-08-05 ENCOUNTER — HOSPITAL ENCOUNTER (OUTPATIENT)
Dept: ULTRASOUND IMAGING | Facility: HOSPITAL | Age: 51
Discharge: HOME | End: 2021-08-05

## 2021-08-05 ENCOUNTER — HOSPITAL ENCOUNTER (OUTPATIENT)
Dept: MAMMOGRAPHY | Facility: HOSPITAL | Age: 51
Discharge: HOME | End: 2021-08-05

## 2021-08-05 DIAGNOSIS — R92.8 ABNORMAL MAMMOGRAM: ICD-10-CM

## 2021-08-05 PROCEDURE — 76642 ULTRASOUND BREAST LIMITED: CPT | Performed by: RADIOLOGY

## 2021-08-05 PROCEDURE — 88305 TISSUE EXAM BY PATHOLOGIST: CPT | Performed by: OBSTETRICS & GYNECOLOGY

## 2021-08-05 PROCEDURE — 25010000003 LIDOCAINE 1 % SOLUTION: Performed by: RADIOLOGY

## 2021-08-05 PROCEDURE — A4648 IMPLANTABLE TISSUE MARKER: HCPCS

## 2021-08-05 PROCEDURE — 19083 BX BREAST 1ST LESION US IMAG: CPT | Performed by: RADIOLOGY

## 2021-08-05 PROCEDURE — 88341 IMHCHEM/IMCYTCHM EA ADD ANTB: CPT | Performed by: OBSTETRICS & GYNECOLOGY

## 2021-08-05 PROCEDURE — 77065 DX MAMMO INCL CAD UNI: CPT | Performed by: RADIOLOGY

## 2021-08-05 PROCEDURE — 88342 IMHCHEM/IMCYTCHM 1ST ANTB: CPT | Performed by: OBSTETRICS & GYNECOLOGY

## 2021-08-05 PROCEDURE — 76642 ULTRASOUND BREAST LIMITED: CPT

## 2021-08-05 RX ORDER — LIDOCAINE HYDROCHLORIDE AND EPINEPHRINE 10; 10 MG/ML; UG/ML
10 INJECTION, SOLUTION INFILTRATION; PERINEURAL ONCE
Status: COMPLETED | OUTPATIENT
Start: 2021-08-05 | End: 2021-08-05

## 2021-08-05 RX ORDER — LIDOCAINE HYDROCHLORIDE 10 MG/ML
5 INJECTION, SOLUTION INFILTRATION; PERINEURAL ONCE
Status: COMPLETED | OUTPATIENT
Start: 2021-08-05 | End: 2021-08-05

## 2021-08-05 RX ADMIN — LIDOCAINE HYDROCHLORIDE 2 ML: 10 INJECTION, SOLUTION INFILTRATION; PERINEURAL at 13:33

## 2021-08-05 RX ADMIN — LIDOCAINE HYDROCHLORIDE,EPINEPHRINE BITARTRATE 10 ML: 10; .01 INJECTION, SOLUTION INFILTRATION; PERINEURAL at 13:34

## 2021-08-05 NOTE — PROGRESS NOTES
Alert and orientated. Denies discomfort., No active bleeding., Steri-strips not visualized, gauze dressing applied. Ice packs given. Verbalizes and demonstrates understanding of post-care instructions, written copy given.

## 2021-08-09 LAB
CYTO UR: NORMAL
LAB AP CASE REPORT: NORMAL
LAB AP CLINICAL INFORMATION: NORMAL
LAB AP DIAGNOSIS COMMENT: NORMAL
LAB AP SPECIAL STAINS: NORMAL
PATH REPORT.FINAL DX SPEC: NORMAL
PATH REPORT.GROSS SPEC: NORMAL

## 2021-08-12 ENCOUNTER — TRANSCRIBE ORDERS (OUTPATIENT)
Dept: MAMMOGRAPHY | Facility: HOSPITAL | Age: 51
End: 2021-08-12

## 2021-08-12 ENCOUNTER — TELEPHONE (OUTPATIENT)
Dept: MAMMOGRAPHY | Facility: HOSPITAL | Age: 51
End: 2021-08-12

## 2021-08-12 DIAGNOSIS — R92.8 ABNORMAL MAMMOGRAM: Primary | ICD-10-CM

## 2021-08-12 NOTE — TELEPHONE ENCOUNTER
Pt notified of pathology results and recommendation. Verbalizes understanding. Denies discomfort.  Denies signs and symptoms of infection. States she does have bruising. Questions answered, verbalized understanding.

## 2021-09-10 ENCOUNTER — OFFICE VISIT (OUTPATIENT)
Dept: INTERNAL MEDICINE | Facility: CLINIC | Age: 51
End: 2021-09-10

## 2021-09-10 ENCOUNTER — LAB (OUTPATIENT)
Dept: LAB | Facility: HOSPITAL | Age: 51
End: 2021-09-10

## 2021-09-10 VITALS
OXYGEN SATURATION: 98 % | DIASTOLIC BLOOD PRESSURE: 70 MMHG | BODY MASS INDEX: 27.47 KG/M2 | HEIGHT: 72 IN | SYSTOLIC BLOOD PRESSURE: 108 MMHG | RESPIRATION RATE: 18 BRPM | TEMPERATURE: 98.1 F | HEART RATE: 82 BPM | WEIGHT: 202.8 LBS

## 2021-09-10 DIAGNOSIS — I87.1 MAY-THURNER SYNDROME: ICD-10-CM

## 2021-09-10 DIAGNOSIS — Z00.00 ANNUAL PHYSICAL EXAM: ICD-10-CM

## 2021-09-10 DIAGNOSIS — Z13.1 SCREENING FOR DIABETES MELLITUS: ICD-10-CM

## 2021-09-10 DIAGNOSIS — I34.1 MITRAL VALVE PROLAPSE: ICD-10-CM

## 2021-09-10 DIAGNOSIS — E55.9 VITAMIN D DEFICIENCY: ICD-10-CM

## 2021-09-10 DIAGNOSIS — Z23 NEED FOR INFLUENZA VACCINATION: ICD-10-CM

## 2021-09-10 DIAGNOSIS — Z00.00 ANNUAL PHYSICAL EXAM: Primary | ICD-10-CM

## 2021-09-10 DIAGNOSIS — M85.852 OSTEOPENIA OF LEFT HIP: ICD-10-CM

## 2021-09-10 DIAGNOSIS — J32.9 CHRONIC SINUSITIS, UNSPECIFIED LOCATION: ICD-10-CM

## 2021-09-10 DIAGNOSIS — E78.2 MODERATE MIXED HYPERLIPIDEMIA NOT REQUIRING STATIN THERAPY: ICD-10-CM

## 2021-09-10 DIAGNOSIS — R92.8 ABNORMAL MAMMOGRAM OF LEFT BREAST: ICD-10-CM

## 2021-09-10 DIAGNOSIS — J30.0 VASOMOTOR RHINITIS: ICD-10-CM

## 2021-09-10 LAB
25(OH)D3 SERPL-MCNC: 41.9 NG/ML
ALBUMIN SERPL-MCNC: 4.6 G/DL (ref 3.5–5.2)
ALBUMIN/GLOB SERPL: 1.6 G/DL
ALP SERPL-CCNC: 71 U/L (ref 39–117)
ALT SERPL W P-5'-P-CCNC: 15 U/L (ref 1–33)
ANION GAP SERPL CALCULATED.3IONS-SCNC: 6.9 MMOL/L (ref 5–15)
AST SERPL-CCNC: 19 U/L (ref 1–32)
BILIRUB SERPL-MCNC: 0.5 MG/DL (ref 0–1.2)
BUN SERPL-MCNC: 14 MG/DL (ref 6–20)
BUN/CREAT SERPL: 14.4 (ref 7–25)
CALCIUM SPEC-SCNC: 9.4 MG/DL (ref 8.6–10.5)
CHLORIDE SERPL-SCNC: 103 MMOL/L (ref 98–107)
CHOLEST SERPL-MCNC: 205 MG/DL (ref 0–200)
CO2 SERPL-SCNC: 28.1 MMOL/L (ref 22–29)
CREAT SERPL-MCNC: 0.97 MG/DL (ref 0.57–1)
DEPRECATED RDW RBC AUTO: 42.3 FL (ref 37–54)
ERYTHROCYTE [DISTWIDTH] IN BLOOD BY AUTOMATED COUNT: 12.9 % (ref 12.3–15.4)
GFR SERPL CREATININE-BSD FRML MDRD: 61 ML/MIN/1.73
GLOBULIN UR ELPH-MCNC: 2.9 GM/DL
GLUCOSE SERPL-MCNC: 88 MG/DL (ref 65–99)
HBA1C MFR BLD: 5.12 % (ref 4.8–5.6)
HCT VFR BLD AUTO: 43 % (ref 34–46.6)
HDLC SERPL-MCNC: 54 MG/DL (ref 40–60)
HGB BLD-MCNC: 14.8 G/DL (ref 12–15.9)
LDLC SERPL CALC-MCNC: 135 MG/DL (ref 0–100)
LDLC/HDLC SERPL: 2.46 {RATIO}
MCH RBC QN AUTO: 31 PG (ref 26.6–33)
MCHC RBC AUTO-ENTMCNC: 34.4 G/DL (ref 31.5–35.7)
MCV RBC AUTO: 90 FL (ref 79–97)
PLATELET # BLD AUTO: 174 10*3/MM3 (ref 140–450)
PMV BLD AUTO: 12.3 FL (ref 6–12)
POTASSIUM SERPL-SCNC: 4.7 MMOL/L (ref 3.5–5.2)
PROT SERPL-MCNC: 7.5 G/DL (ref 6–8.5)
RBC # BLD AUTO: 4.78 10*6/MM3 (ref 3.77–5.28)
SODIUM SERPL-SCNC: 138 MMOL/L (ref 136–145)
TRIGL SERPL-MCNC: 90 MG/DL (ref 0–150)
VLDLC SERPL-MCNC: 16 MG/DL (ref 5–40)
WBC # BLD AUTO: 4.71 10*3/MM3 (ref 3.4–10.8)

## 2021-09-10 PROCEDURE — 90686 IIV4 VACC NO PRSV 0.5 ML IM: CPT | Performed by: INTERNAL MEDICINE

## 2021-09-10 PROCEDURE — 80061 LIPID PANEL: CPT

## 2021-09-10 PROCEDURE — 85027 COMPLETE CBC AUTOMATED: CPT

## 2021-09-10 PROCEDURE — 80053 COMPREHEN METABOLIC PANEL: CPT

## 2021-09-10 PROCEDURE — 99396 PREV VISIT EST AGE 40-64: CPT | Performed by: INTERNAL MEDICINE

## 2021-09-10 PROCEDURE — 90471 IMMUNIZATION ADMIN: CPT | Performed by: INTERNAL MEDICINE

## 2021-09-10 PROCEDURE — 83036 HEMOGLOBIN GLYCOSYLATED A1C: CPT

## 2021-09-10 PROCEDURE — 82306 VITAMIN D 25 HYDROXY: CPT

## 2021-09-10 NOTE — PROGRESS NOTES
Internal Medicine Annual Exam  Emiliana Garcia is a 51 y.o. female who presents today for an annual exam and with concerns as outlined below.    Chief Complaint  Chief Complaint   Patient presents with   • Annual Exam     May Thurner, MVP, vitamin D deficiency        HPI  Ms. Garcia comes in today for annual physical. She has been doing well over the last year. She is working from home full time. She continues to train for half marathons. She walks several miles most days of the week. Has upcoming half marathon in [place] in November. She notes diet could be better but she does primarily drink water, limited soda and other drinks. She had breast biopsy of suspicious area in L breast last month. Path negative. She is UTD with dermatology, vision, dental exams. She has been vaccinated for COVID19. She defers colon cancer screening today.       Review of Systems  Review of Systems   Constitutional: Negative.    Eyes: Negative.    Respiratory: Negative.    Cardiovascular: Positive for leg swelling (stable if not slightly improved). Negative for chest pain and palpitations.   Gastrointestinal: Negative.    Genitourinary: Negative.    Musculoskeletal: Negative.    Skin: Negative.    Neurological: Negative.    Psychiatric/Behavioral: Negative.         Past Medical History  Past Medical History:   Diagnosis Date   • Allergic    • Deep vein thrombosis (CMS/HCC)    • Fractures    • Heart murmur     MVP   • May-Thurner syndrome    • Migraine    • Mitral valve prolapse    • Osteopenia    • Vasomotor rhinitis         Surgical History  Past Surgical History:   Procedure Laterality Date   • AUGMENTATION MAMMAPLASTY      2012   • WRIST FRACTURE SURGERY Left 2016        Family History  Family History   Problem Relation Age of Onset   • Hypertension Father    • Alzheimer's disease Father    • Prostate cancer Father    • Skin cancer Father         melanoma   • Thyroid disease Brother    • Osteoporosis Maternal Grandmother    • Alzheimer's  disease Maternal Grandmother    • Stroke Maternal Grandfather    • Heart attack Paternal Grandmother    • Hypertension Paternal Grandmother    • Lupus Mother         skin involvement   • Lupus Sister    • Breast cancer Neg Hx    • Ovarian cancer Neg Hx         Social History  Social History     Socioeconomic History   • Marital status:      Spouse name: Not on file   • Number of children: Not on file   • Years of education: Not on file   • Highest education level: Not on file   Tobacco Use   • Smoking status: Never Smoker   • Smokeless tobacco: Never Used   Substance and Sexual Activity   • Alcohol use: Yes     Alcohol/week: 2.0 standard drinks     Types: 2 Glasses of wine per week     Comment: socially, 1-2 glasses of wine   • Drug use: No        Current Medications  Current Outpatient Medications on File Prior to Visit   Medication Sig Dispense Refill   • Glucosamine-Chondroitin (GLUCOSAMINE CHONDR COMPLEX PO) Take 1 tablet by mouth Daily.     • ipratropium (ATROVENT) 0.03 % nasal spray 2 sprays into the nostril(s) as directed by provider Daily As Needed.     • loratadine (CLARITIN) 10 MG tablet Take 10 mg by mouth Every Other Day.     • Montelukast Sodium (SINGULAIR PO) Take 10 mg by mouth Every Other Day.     • MULTIPLE VITAMIN PO Multiple Vitamin capsule     • NASAL SALINE NA into the nostril(s) as directed by provider.     • spironolactone (ALDACTONE) 50 MG tablet   1   • [DISCONTINUED] Cholecalciferol (VITAMIN D PO) Take 2,000 Units by mouth 2 (Two) Times a Day.       No current facility-administered medications on file prior to visit.       Allergies  Allergies   Allergen Reactions   • Bactrim [Sulfamethoxazole-Trimethoprim] Itching     All over itching, chills, jittery   • Erythromycin Other (See Comments)     Stomach pains, rash   • Lorabid [Loracarbef] Other (See Comments)     Stomach pains, rash     • Penicillins Other (See Comments)     Allergy unknown, happened as a child        Objective  Visit  "Vitals  /70   Pulse 82   Temp 98.1 °F (36.7 °C)   Resp 18   Ht 185.4 cm (73\")   Wt 92 kg (202 lb 12.8 oz)   SpO2 98%   Breastfeeding No   BMI 26.76 kg/m²        Physical Exam  Physical Exam  Vitals and nursing note reviewed.   Constitutional:       General: She is not in acute distress.     Appearance: She is well-developed and normal weight. She is not ill-appearing, toxic-appearing or diaphoretic.   HENT:      Head: Normocephalic and atraumatic.      Right Ear: Tympanic membrane, ear canal and external ear normal.      Left Ear: Tympanic membrane, ear canal and external ear normal.      Nose: Nose normal.   Eyes:      General: No scleral icterus.     Extraocular Movements: Extraocular movements intact.      Conjunctiva/sclera: Conjunctivae normal.      Pupils: Pupils are equal, round, and reactive to light.   Cardiovascular:      Rate and Rhythm: Normal rate and regular rhythm.      Heart sounds: Normal heart sounds.   Pulmonary:      Effort: Pulmonary effort is normal. No respiratory distress.      Breath sounds: Normal breath sounds.   Abdominal:      General: Bowel sounds are normal. There is no distension.      Palpations: Abdomen is soft. There is no mass.      Tenderness: There is no abdominal tenderness.   Musculoskeletal:         General: No deformity.      Cervical back: Neck supple.      Right lower leg: No edema.      Left lower leg: Edema (mild nonpitting) present.   Lymphadenopathy:      Cervical: No cervical adenopathy.   Skin:     General: Skin is warm and dry.      Findings: No rash.   Neurological:      Mental Status: She is alert and oriented to person, place, and time. Mental status is at baseline.      Gait: Gait normal.   Psychiatric:         Mood and Affect: Mood normal.         Behavior: Behavior normal.         Thought Content: Thought content normal.         Judgment: Judgment normal.          Results  Results for orders placed or performed during the hospital encounter of 08/05/21 "   Tissue Pathology Exam    Specimen: Breast, Left; Tissue   Result Value Ref Range    Case Report       Surgical Pathology Report                         Case: [id number]                                  Authorizing Provider:  Maria E Iyer MD        Collected:           08/05/2021 01:40 PM          Ordering Location:     [facility]   Received:            08/05/2021 02:24 PM                                 [facility] [id number]                                                                                  ULTRASOUND                                                                   Pathologist:           Tracie Curry MD                                                   Specimen:    Breast, Left, LEFT BREAST 5:00 3.6 CM COMPLEX SOLID AND CYSTIC MASS                        Clinical Information       Left breast 5:00 3.6 cm complex solid and cystic mass      Final Diagnosis       LEFT BREAST, LOQ, CORE BIOPSIES:  Benign breast tissue demonstrating multiple complex apocrine cysts  Accompanying foci of stromal fibrosis  Negative for atypical hyperplasia/malignancy      Comment       This report was sent to the radiologist at [facility] on 8-9-2021.      Gross Description          Specimen is received in formalin labeled left breast ultrasound biopsy and consists of a 2.0 x 1.5 x 0.5 cm aggregate of yellow-pink fibrofatty breast tissue cores which were placed in formalin at 1340 on 8/5/2021 and are now submitted in block 1A.  The cold ischemic time is less than 60 minutes, the total time in formalin is greater than 6 and less than 72 hours.  HM      Special Stains       A selection of (2) immunohistochemical stains is performed, evaluated with the appropriate controls, with the following results: Smooth muscle myosin heavy chain-negative for invasive carcinoma, [id number] for invasive carcinoma.      Microscopic Description       The slides are reviewed and  demonstrate histopathologic features supporting the above rendered diagnosis.            Assessment and Plan  Diagnoses and all orders for this visit:    Annual physical exam  - Counseling was given to patient for the following topics:  appropriate exercise, healthy eating habits, disease prevention, importance of self breast exam and breast health, importance of immunizations, including risks and benefits and bone health. Also discussed the importance of regular dental and vision care, as well recommendation for a yearly screening skin exam after age 40.    - Flu shot today  - She will consider colon cancer screening    Vitamin D deficiency  - Last vitamin D level normal while holding supplement. Prior to that had hypervitaminosis D.  - Will check vitamin D level again today to determine if supplement should be resumed.    Moderate mixed hyperlipidemia not requiring statin therapy  - Repeat lipid panel today.    Osteopenia of left hip  - DEXA 10/2019 with osteopenia, FRAX 5.2% and 0.9%  - Holding vitamin D supplementation as above. Continue weight bearing exercise  - Repeat DEXA 10/2021, ordered    Mitral valve prolapse  - Review of outside records with mild mitral valve prolapse  - Currently asymptomatic  - Need prior echocardiogram results, patient will check her records    May-Thurner syndrome  - Affecting LLE, no history of DVT and no signs of clot today  - Stable    Vasomotor rhinitis  - Follows with ENT and on ipratropium nasal spray    Chronic sinusitis, unspecified location  - Follows with an ENT and takes claritin, singulair, nasal saline    Abnormal mammogram of left breast  - Mammogram 7/28/2021 and additional views with 3.6cm L breast mass  - Biopsied 8/5 with focal stromal fibrosis, negative for malignancy  - Repeat mammogram 2/2021    Screening for diabetes mellitus  - A1c ordered      Health Maintenance   Topic Date Due   • COLORECTAL CANCER SCREENING  Never done   • LIPID PANEL  09/08/2021   •  INFLUENZA VACCINE  10/01/2021   • DXA SCAN  10/28/2021   • PAP SMEAR  02/01/2022   • ANNUAL PHYSICAL  09/11/2022   • TDAP/TD VACCINES (2 - Td or Tdap) 10/10/2022   • HEPATITIS C SCREENING  Completed   • COVID-19 Vaccine  Completed   • Pneumococcal Vaccine 0-64  Aged Out       Health Maintenance  - Pap smear: Last 2/2019 at GYN, ASCUS HPV negative. Repeat cotesting in 3 years.  - Mammogram: 8/2021 with negative biopsy, repeat 2/2021  - Colonoscopy: discussed, declined both cscope and cologuard today but will consider options.  - Immunizations: Flu today. COVID complete. Tdap 2012.    Return in about 1 year (around 9/10/2022) for Annual, Labs today.

## 2021-09-10 NOTE — PROGRESS NOTES
Immunization  Immunization performed in Left Deltoid by Gayatri Bruner MA. Pt tolerated Flu vaccine with no adverse effects  09/10/21   Gayatri Bruner MA

## 2021-09-24 ENCOUNTER — HOSPITAL ENCOUNTER (OUTPATIENT)
Dept: BONE DENSITY | Facility: HOSPITAL | Age: 51
Discharge: HOME | End: 2021-09-24
Admitting: INTERNAL MEDICINE

## 2021-09-24 DIAGNOSIS — M85.852 OSTEOPENIA OF LEFT HIP: ICD-10-CM

## 2021-09-24 PROCEDURE — 77080 DXA BONE DENSITY AXIAL: CPT

## 2021-11-01 ENCOUNTER — TELEPHONE (OUTPATIENT)
Dept: INTERNAL MEDICINE | Facility: CLINIC | Age: 51
End: 2021-11-01

## 2021-11-01 NOTE — TELEPHONE ENCOUNTER
FARA FROM DR. FLORES OFFICE CALLED TO GET A COPY OF PATIENT'S MOST RECENT LABS SENT OVER. PATIENT HAD APPOINTMENT THIS MORNING.        KOS-[phone]    FARA - [phone]

## 2022-02-16 ENCOUNTER — APPOINTMENT (OUTPATIENT)
Dept: MAMMOGRAPHY | Facility: HOSPITAL | Age: 52
End: 2022-02-16

## 2022-06-20 ENCOUNTER — HOSPITAL ENCOUNTER (OUTPATIENT)
Dept: MAMMOGRAPHY | Facility: HOSPITAL | Age: 52
Discharge: HOME | End: 2022-06-20
Admitting: OBSTETRICS & GYNECOLOGY

## 2022-06-20 DIAGNOSIS — R92.8 ABNORMAL MAMMOGRAM: ICD-10-CM

## 2022-06-20 PROCEDURE — 77065 DX MAMMO INCL CAD UNI: CPT | Performed by: RADIOLOGY

## 2022-06-20 PROCEDURE — G0279 TOMOSYNTHESIS, MAMMO: HCPCS

## 2022-06-20 PROCEDURE — 77061 BREAST TOMOSYNTHESIS UNI: CPT | Performed by: RADIOLOGY

## 2022-06-20 PROCEDURE — 77065 DX MAMMO INCL CAD UNI: CPT

## 2022-09-29 ENCOUNTER — LAB (OUTPATIENT)
Dept: LAB | Facility: HOSPITAL | Age: 52
End: 2022-09-29

## 2022-09-29 ENCOUNTER — OFFICE VISIT (OUTPATIENT)
Dept: INTERNAL MEDICINE | Facility: CLINIC | Age: 52
End: 2022-09-29

## 2022-09-29 VITALS
WEIGHT: 185 LBS | HEART RATE: 68 BPM | HEIGHT: 72 IN | OXYGEN SATURATION: 96 % | SYSTOLIC BLOOD PRESSURE: 108 MMHG | DIASTOLIC BLOOD PRESSURE: 76 MMHG | BODY MASS INDEX: 25.06 KG/M2 | TEMPERATURE: 97.4 F

## 2022-09-29 DIAGNOSIS — E78.2 MODERATE MIXED HYPERLIPIDEMIA NOT REQUIRING STATIN THERAPY: ICD-10-CM

## 2022-09-29 DIAGNOSIS — Z00.00 ANNUAL PHYSICAL EXAM: Primary | ICD-10-CM

## 2022-09-29 DIAGNOSIS — Z12.31 ENCOUNTER FOR SCREENING MAMMOGRAM FOR MALIGNANT NEOPLASM OF BREAST: ICD-10-CM

## 2022-09-29 DIAGNOSIS — M85.852 OSTEOPENIA OF LEFT HIP: ICD-10-CM

## 2022-09-29 DIAGNOSIS — J30.0 VASOMOTOR RHINITIS: ICD-10-CM

## 2022-09-29 DIAGNOSIS — Z12.11 SCREENING FOR MALIGNANT NEOPLASM OF COLON: ICD-10-CM

## 2022-09-29 DIAGNOSIS — Z23 NEED FOR INFLUENZA VACCINATION: ICD-10-CM

## 2022-09-29 DIAGNOSIS — Z23 NEED FOR TDAP VACCINATION: ICD-10-CM

## 2022-09-29 DIAGNOSIS — I34.1 MITRAL VALVE PROLAPSE: ICD-10-CM

## 2022-09-29 DIAGNOSIS — Z00.00 ANNUAL PHYSICAL EXAM: ICD-10-CM

## 2022-09-29 DIAGNOSIS — I87.1 MAY-THURNER SYNDROME: ICD-10-CM

## 2022-09-29 PROBLEM — R92.8 ABNORMAL MAMMOGRAM OF LEFT BREAST: Status: RESOLVED | Noted: 2021-09-10 | Resolved: 2022-09-29

## 2022-09-29 PROBLEM — D69.6 THROMBOCYTOPENIA (HCC): Status: RESOLVED | Noted: 2019-09-09 | Resolved: 2022-09-29

## 2022-09-29 LAB
DEPRECATED RDW RBC AUTO: 41 FL (ref 37–54)
ERYTHROCYTE [DISTWIDTH] IN BLOOD BY AUTOMATED COUNT: 12.3 % (ref 12.3–15.4)
HBA1C MFR BLD: 5.3 % (ref 4.8–5.6)
HCT VFR BLD AUTO: 45.8 % (ref 34–46.6)
HGB BLD-MCNC: 14.9 G/DL (ref 12–15.9)
MCH RBC QN AUTO: 29.3 PG (ref 26.6–33)
MCHC RBC AUTO-ENTMCNC: 32.5 G/DL (ref 31.5–35.7)
MCV RBC AUTO: 90 FL (ref 79–97)
PLATELET # BLD AUTO: 182 10*3/MM3 (ref 140–450)
PMV BLD AUTO: 12.4 FL (ref 6–12)
RBC # BLD AUTO: 5.09 10*6/MM3 (ref 3.77–5.28)
WBC NRBC COR # BLD: 5.31 10*3/MM3 (ref 3.4–10.8)

## 2022-09-29 PROCEDURE — 90715 TDAP VACCINE 7 YRS/> IM: CPT | Performed by: INTERNAL MEDICINE

## 2022-09-29 PROCEDURE — 90472 IMMUNIZATION ADMIN EACH ADD: CPT | Performed by: INTERNAL MEDICINE

## 2022-09-29 PROCEDURE — 90471 IMMUNIZATION ADMIN: CPT | Performed by: INTERNAL MEDICINE

## 2022-09-29 PROCEDURE — 83036 HEMOGLOBIN GLYCOSYLATED A1C: CPT

## 2022-09-29 PROCEDURE — 99396 PREV VISIT EST AGE 40-64: CPT | Performed by: INTERNAL MEDICINE

## 2022-09-29 PROCEDURE — 80053 COMPREHEN METABOLIC PANEL: CPT

## 2022-09-29 PROCEDURE — 85027 COMPLETE CBC AUTOMATED: CPT

## 2022-09-29 PROCEDURE — 90686 IIV4 VACC NO PRSV 0.5 ML IM: CPT | Performed by: INTERNAL MEDICINE

## 2022-09-29 PROCEDURE — 80061 LIPID PANEL: CPT

## 2022-09-29 PROCEDURE — 82306 VITAMIN D 25 HYDROXY: CPT

## 2022-09-29 RX ORDER — CHLORAL HYDRATE 500 MG
1 CAPSULE ORAL
COMMUNITY

## 2022-09-29 NOTE — PROGRESS NOTES
Internal Medicine Annual Exam  Emiliana Garcia is a 52 y.o. female who presents today for an annual exam and with concerns as outlined below.    Chief Complaint  Chief Complaint   Patient presents with   • Annual Exam        HPI  Ms. Garcia comes in today for her physical. She remains relatively healthy. She continues to do 2 half marathons a year. She has lost 20+ lbs with low carb diet including reduction in alcohol intake and portion control. She is up to date with dental, vision exam. Skin exam upcoming in November. She is due for screening mammogram and now ready for colonoscopy. She would like to update her vaccinations as well. She is interested in Tdap and flu today. Will return for COVID booster and shingrix. Denies use of tobacco, ecigarettes, illicit drugs, and drinks very minimal alcohol.         Review of Systems  Review of Systems   Constitutional: Negative.    HENT: Negative for hearing loss.    Eyes: Negative.    Respiratory: Negative.    Cardiovascular: Negative.    Gastrointestinal: Negative.    Genitourinary: Negative.    Musculoskeletal: Negative.    Skin: Negative.    Neurological: Negative.    Psychiatric/Behavioral: Negative.         Past Medical History  Past Medical History:   Diagnosis Date   • Abnormal mammogram of left breast 9/10/2021   • Allergic    • Closed fracture of distal end of radius 9/12/2016   • Closed fracture of distal end of tibia 4/25/2016   • Closed fracture of styloid process of ulna 9/12/2016   • Deep vein thrombosis (HCC)    • Elbow joint pain 9/12/2016   • Fractures    • Heart murmur     MVP   • May-Thurner syndrome    • Migraine    • Mitral valve prolapse    • Osteopenia    • Pain in left foot 4/6/2016   • Sprain of ankle 10/7/2016   • Stress fracture of tibia 5/20/2016   • Thrombocytopenia (HCC) 9/9/2019   • Vasomotor rhinitis         Surgical History  Past Surgical History:   Procedure Laterality Date   • AUGMENTATION MAMMAPLASTY      2012   • BREAST BIOPSY Left  08/05/2021   • ENDOMETRIAL ABLATION      Uterine ablation to alleviate heavy periods   • FRACTURE SURGERY      Repair broken left wrist with titanium plate and 8 screws   • WRIST FRACTURE SURGERY Left 2016        Family History  Family History   Problem Relation Age of Onset   • Hypertension Father    • Alzheimer's disease Father    • Prostate cancer Father    • Skin cancer Father         melanoma   • Thyroid disease Brother    • Osteoporosis Maternal Grandmother    • Alzheimer's disease Maternal Grandmother    • Stroke Maternal Grandfather    • Heart attack Paternal Grandmother    • Hypertension Paternal Grandmother    • Lupus Mother         skin involvement   • Other Mother         Lupus   • Lupus Sister    • Other Sister         Lupus   • Breast cancer Neg Hx    • Ovarian cancer Neg Hx         Social History  Social History     Socioeconomic History   • Marital status:    Tobacco Use   • Smoking status: Never Smoker   • Smokeless tobacco: Never Used   Vaping Use   • Vaping Use: Never used   Substance and Sexual Activity   • Alcohol use: Not Currently     Comment: socially, 1-2 glasses of wine   • Drug use: No   • Sexual activity: Yes     Partners: Male     Birth control/protection: Essure     Comment: With         Current Medications  Current Outpatient Medications on File Prior to Visit   Medication Sig Dispense Refill   • Glucosamine-Chondroitin (GLUCOSAMINE CHONDR COMPLEX PO) Take 1 tablet by mouth Daily.     • ipratropium (ATROVENT) 0.03 % nasal spray 2 sprays into the nostril(s) as directed by provider Daily As Needed.     • loratadine (CLARITIN) 10 MG tablet Take 10 mg by mouth Every Other Day.     • Montelukast Sodium (SINGULAIR PO) Take 10 mg by mouth Every Other Day.     • MULTIPLE VITAMIN PO Multiple Vitamin capsule     • NASAL SALINE NA into the nostril(s) as directed by provider.     • spironolactone (ALDACTONE) 50 MG tablet Take 50 mg by mouth 2 (Two) Times a Day.  1     No current  "facility-administered medications on file prior to visit.       Allergies  Allergies   Allergen Reactions   • Bactrim [Sulfamethoxazole-Trimethoprim] Itching     All over itching, chills, jittery   • Erythromycin Other (See Comments)     Stomach pains, rash   • Lorabid [Loracarbef] Other (See Comments)     Stomach pains, rash     • Penicillins Other (See Comments)     Allergy unknown, happened as a child        Objective  Visit Vitals  /76   Pulse 68   Temp 97.4 °F (36.3 °C)   Ht 185.4 cm (73\")   Wt 83.9 kg (185 lb)   SpO2 96%   BMI 24.41 kg/m²        Physical Exam  Physical Exam  Vitals and nursing note reviewed.   Constitutional:       General: She is not in acute distress.     Appearance: Normal appearance. She is well-developed and normal weight. She is not ill-appearing, toxic-appearing or diaphoretic.   HENT:      Head: Normocephalic and atraumatic.      Right Ear: Tympanic membrane, ear canal and external ear normal.      Left Ear: Tympanic membrane, ear canal and external ear normal.      Nose: Nose normal.   Eyes:      General: No scleral icterus.     Conjunctiva/sclera: Conjunctivae normal.   Cardiovascular:      Rate and Rhythm: Normal rate and regular rhythm.      Heart sounds: Normal heart sounds. No murmur heard.  Pulmonary:      Effort: Pulmonary effort is normal. No respiratory distress.      Breath sounds: Normal breath sounds.   Abdominal:      General: Bowel sounds are normal. There is no distension.      Palpations: Abdomen is soft. There is no mass.      Tenderness: There is no abdominal tenderness.   Musculoskeletal:         General: No deformity.      Cervical back: Neck supple.      Right lower leg: No edema.      Left lower leg: No edema.   Lymphadenopathy:      Cervical: No cervical adenopathy.   Skin:     General: Skin is warm and dry.      Findings: No rash.   Neurological:      Mental Status: She is alert and oriented to person, place, and time. Mental status is at baseline.      " Gait: Gait normal.   Psychiatric:         Mood and Affect: Mood normal.         Behavior: Behavior normal.         Thought Content: Thought content normal.         Judgment: Judgment normal.          Results  Results for orders placed or performed in visit on 09/10/21   CBC (No Diff)    Specimen: Blood   Result Value Ref Range    WBC 4.71 3.40 - 10.80 10*3/mm3    RBC 4.78 3.77 - 5.28 10*6/mm3    Hemoglobin 14.8 12.0 - 15.9 g/dL    Hematocrit 43.0 34.0 - 46.6 %    MCV 90.0 79.0 - 97.0 fL    MCH 31.0 26.6 - 33.0 pg    MCHC 34.4 31.5 - 35.7 g/dL    RDW 12.9 12.3 - 15.4 %    RDW-SD 42.3 37.0 - 54.0 fl    MPV 12.3 (H) 6.0 - 12.0 fL    Platelets 174 140 - 450 10*3/mm3   Comprehensive Metabolic Panel    Specimen: Blood   Result Value Ref Range    Glucose 88 65 - 99 mg/dL    BUN 14 6 - 20 mg/dL    Creatinine 0.97 0.57 - 1.00 mg/dL    Sodium 138 136 - 145 mmol/L    Potassium 4.7 3.5 - 5.2 mmol/L    Chloride 103 98 - 107 mmol/L    CO2 28.1 22.0 - 29.0 mmol/L    Calcium 9.4 8.6 - 10.5 mg/dL    Total Protein 7.5 6.0 - 8.5 g/dL    Albumin 4.60 3.50 - 5.20 g/dL    ALT (SGPT) 15 1 - 33 U/L    AST (SGOT) 19 1 - 32 U/L    Alkaline Phosphatase 71 39 - 117 U/L    Total Bilirubin 0.5 0.0 - 1.2 mg/dL    eGFR Non African Amer 61 >60 mL/min/1.73    Globulin 2.9 gm/dL    A/G Ratio 1.6 g/dL    BUN/Creatinine Ratio 14.4 7.0 - 25.0    Anion Gap 6.9 5.0 - 15.0 mmol/L   Lipid Panel    Specimen: Blood   Result Value Ref Range    Total Cholesterol 205 (H) 0 - 200 mg/dL    Triglycerides 90 0 - 150 mg/dL    HDL Cholesterol 54 40 - 60 mg/dL    LDL Cholesterol  135 (H) 0 - 100 mg/dL    VLDL Cholesterol 16 5 - 40 mg/dL    LDL/HDL Ratio 2.46    Hemoglobin A1c    Specimen: Blood   Result Value Ref Range    Hemoglobin A1C 5.12 4.80 - 5.60 %   Vitamin D 25 Hydroxy    Specimen: Blood   Result Value Ref Range    25 Hydroxy, Vitamin D 41.9 ng/ml        Assessment and Plan  Diagnoses and all orders for this visit:    Annual physical exam  - Counseling was  given to patient for the following topics:  appropriate exercise, healthy eating habits, disease prevention, importance of self breast exam and breast health, importance of immunizations, including risks and benefits and bone health. Also discussed the importance of regular dental and vision care, as well recommendation for a yearly screening skin exam after age 40.     Osteopenia of left hip  - DEXA 9/2021 with osteopenia of L hip and femoral neck. Improving BMD.  - Continue weight bearing exercise. Has not required vitamin D supplement for a few years, will check level.  - Next DEXA in 2023.    Moderate mixed hyperlipidemia not requiring statin therapy  - On fish oil and has improved diet, lost weight  - recheck lipid panel    Mitral valve prolapse  - records with echo from 2009 showing mild holosystolic mitral valve prolapse and trace MR/TR.   - Clinically asymptomatic. No significant murmur on exam.  - Monitor    May-Thurner syndrome  - Affecting LLE, no history of DVT and no signs of clot today  - Stable    Vasomotor rhinitis  - Follows with ENT and on ipratropium nasal spray    Encounter for screening mammogram for malignant neoplasm of breast  -     Mammo Screening Digital Tomosynthesis Bilateral With CAD; Future    Screening for malignant neoplasm of colon  -     Ambulatory Referral For Screening Colonoscopy      Health Maintenance   Topic Date Due   • COLORECTAL CANCER SCREENING  Never done   • COVID-19 Vaccine (4 - Booster for Pfizer series) 01/29/2022   • PAP SMEAR  02/01/2022   • INFLUENZA VACCINE  08/01/2022   • ZOSTER VACCINE (1 of 2) Never done   • LIPID PANEL  09/10/2022   • ANNUAL PHYSICAL  09/11/2022   • TDAP/TD VACCINES (2 - Td or Tdap) 10/10/2022   • MAMMOGRAM  12/20/2022   • DXA SCAN  09/24/2023   • HEPATITIS C SCREENING  Completed   • Pneumococcal Vaccine 0-64  Aged Out       Health Maintenance  - Pap smear: UTD, GYN  - Mammogram: due for screening, ordered  - Colonoscopy: ordered  - HCV:  negative  - Immunizations: Flu and Tdap today. COVID booster and shingrix discussed.     Return in about 1 year (around 9/29/2023) for Annual, Labs today.

## 2022-09-30 LAB
25(OH)D3 SERPL-MCNC: 45.2 NG/ML (ref 30–100)
ALBUMIN SERPL-MCNC: 4.7 G/DL (ref 3.5–5.2)
ALBUMIN/GLOB SERPL: 1.6 G/DL
ALP SERPL-CCNC: 72 U/L (ref 39–117)
ALT SERPL W P-5'-P-CCNC: 11 U/L (ref 1–33)
ANION GAP SERPL CALCULATED.3IONS-SCNC: 13 MMOL/L (ref 5–15)
AST SERPL-CCNC: 19 U/L (ref 1–32)
BILIRUB SERPL-MCNC: 0.6 MG/DL (ref 0–1.2)
BUN SERPL-MCNC: 13 MG/DL (ref 6–20)
BUN/CREAT SERPL: 15.1 (ref 7–25)
CALCIUM SPEC-SCNC: 9.4 MG/DL (ref 8.6–10.5)
CHLORIDE SERPL-SCNC: 98 MMOL/L (ref 98–107)
CHOLEST SERPL-MCNC: 193 MG/DL (ref 0–200)
CO2 SERPL-SCNC: 27 MMOL/L (ref 22–29)
CREAT SERPL-MCNC: 0.86 MG/DL (ref 0.57–1)
EGFRCR SERPLBLD CKD-EPI 2021: 82.4 ML/MIN/1.73
GLOBULIN UR ELPH-MCNC: 3 GM/DL
GLUCOSE SERPL-MCNC: 89 MG/DL (ref 65–99)
HDLC SERPL-MCNC: 51 MG/DL (ref 40–60)
LDLC SERPL CALC-MCNC: 130 MG/DL (ref 0–100)
LDLC/HDLC SERPL: 2.53 {RATIO}
POTASSIUM SERPL-SCNC: 4 MMOL/L (ref 3.5–5.2)
PROT SERPL-MCNC: 7.7 G/DL (ref 6–8.5)
SODIUM SERPL-SCNC: 138 MMOL/L (ref 136–145)
TRIGL SERPL-MCNC: 64 MG/DL (ref 0–150)
VLDLC SERPL-MCNC: 12 MG/DL (ref 5–40)

## 2022-10-28 ENCOUNTER — CLINICAL SUPPORT (OUTPATIENT)
Dept: INTERNAL MEDICINE | Facility: CLINIC | Age: 52
End: 2022-10-28

## 2022-10-28 ENCOUNTER — TELEPHONE (OUTPATIENT)
Dept: INTERNAL MEDICINE | Facility: CLINIC | Age: 52
End: 2022-10-28

## 2022-10-28 DIAGNOSIS — Z23 NEED FOR VACCINATION: Primary | ICD-10-CM

## 2022-10-28 PROCEDURE — 0124A PR ADM SARSCOV2 30MCG/0.3ML BST: CPT | Performed by: INTERNAL MEDICINE

## 2022-10-28 PROCEDURE — 91312 COVID-19 (PFIZER) BIVALENT BOOSTER 12+YRS: CPT | Performed by: INTERNAL MEDICINE

## 2022-10-28 PROCEDURE — 90471 IMMUNIZATION ADMIN: CPT | Performed by: INTERNAL MEDICINE

## 2022-10-28 PROCEDURE — 90750 HZV VACC RECOMBINANT IM: CPT | Performed by: INTERNAL MEDICINE

## 2022-10-28 NOTE — PROGRESS NOTES
Immunization  Immunization performed in Covid Shot in left deltoid (4th shot) shingles shot in right deltoid by Haritha Gordon MA. Patient tolerated the procedure well without complications.  10/28/22   Haritha Gordon MA

## 2022-12-13 ENCOUNTER — HOSPITAL ENCOUNTER (OUTPATIENT)
Dept: MAMMOGRAPHY | Facility: HOSPITAL | Age: 52
Discharge: HOME | End: 2022-12-13
Admitting: INTERNAL MEDICINE

## 2022-12-13 DIAGNOSIS — Z12.31 ENCOUNTER FOR SCREENING MAMMOGRAM FOR MALIGNANT NEOPLASM OF BREAST: ICD-10-CM

## 2022-12-13 PROCEDURE — 77063 BREAST TOMOSYNTHESIS BI: CPT | Performed by: RADIOLOGY

## 2022-12-13 PROCEDURE — 77067 SCR MAMMO BI INCL CAD: CPT | Performed by: RADIOLOGY

## 2022-12-13 PROCEDURE — 77063 BREAST TOMOSYNTHESIS BI: CPT

## 2022-12-13 PROCEDURE — 77067 SCR MAMMO BI INCL CAD: CPT

## 2022-12-27 DIAGNOSIS — Z12.11 SCREENING FOR COLON CANCER: Primary | ICD-10-CM

## 2023-01-16 ENCOUNTER — OUTSIDE FACILITY SERVICE (OUTPATIENT)
Dept: GASTROENTEROLOGY | Facility: CLINIC | Age: 53
End: 2023-01-16
Payer: COMMERCIAL

## 2023-01-16 PROCEDURE — 45385 COLONOSCOPY W/LESION REMOVAL: CPT | Performed by: INTERNAL MEDICINE

## 2023-01-16 PROCEDURE — 88305 TISSUE EXAM BY PATHOLOGIST: CPT

## 2023-01-17 ENCOUNTER — LAB REQUISITION (OUTPATIENT)
Dept: LAB | Facility: HOSPITAL | Age: 53
End: 2023-01-17
Payer: COMMERCIAL

## 2023-01-17 DIAGNOSIS — K64.8 OTHER HEMORRHOIDS: ICD-10-CM

## 2023-01-17 DIAGNOSIS — D12.8 BENIGN NEOPLASM OF RECTUM: ICD-10-CM

## 2023-01-17 DIAGNOSIS — Z12.11 ENCOUNTER FOR SCREENING FOR MALIGNANT NEOPLASM OF COLON: ICD-10-CM

## 2023-01-18 LAB — REF LAB TEST METHOD: NORMAL

## 2023-01-20 ENCOUNTER — CLINICAL SUPPORT (OUTPATIENT)
Dept: INTERNAL MEDICINE | Facility: CLINIC | Age: 53
End: 2023-01-20
Payer: COMMERCIAL

## 2023-01-20 DIAGNOSIS — Z23 NEED FOR VACCINATION: Primary | ICD-10-CM

## 2023-01-20 PROCEDURE — 90750 HZV VACC RECOMBINANT IM: CPT | Performed by: INTERNAL MEDICINE

## 2023-01-20 NOTE — PROGRESS NOTES
Immunization  Immunization performed in left deltoid by Haritha Gordon MA. Patient tolerated the procedure well without complications.  01/20/23   Haritha Gordon MA

## 2023-03-31 ENCOUNTER — TRANSCRIBE ORDERS (OUTPATIENT)
Dept: LAB | Facility: HOSPITAL | Age: 53
End: 2023-03-31
Payer: COMMERCIAL

## 2023-03-31 ENCOUNTER — LAB (OUTPATIENT)
Dept: LAB | Facility: HOSPITAL | Age: 53
End: 2023-03-31
Payer: COMMERCIAL

## 2023-03-31 DIAGNOSIS — R23.4 CHANGES IN SKIN TEXTURE: Primary | ICD-10-CM

## 2023-03-31 DIAGNOSIS — R23.4 CHANGES IN SKIN TEXTURE: ICD-10-CM

## 2023-03-31 LAB
ALBUMIN SERPL-MCNC: 4.4 G/DL (ref 3.5–5.2)
ALBUMIN/GLOB SERPL: 1.4 G/DL
ALP SERPL-CCNC: 68 U/L (ref 39–117)
ALT SERPL W P-5'-P-CCNC: 14 U/L (ref 1–33)
ANION GAP SERPL CALCULATED.3IONS-SCNC: 10.2 MMOL/L (ref 5–15)
AST SERPL-CCNC: 20 U/L (ref 1–32)
BASOPHILS # BLD AUTO: 0.01 10*3/MM3 (ref 0–0.2)
BASOPHILS NFR BLD AUTO: 0.2 % (ref 0–1.5)
BILIRUB SERPL-MCNC: 0.6 MG/DL (ref 0–1.2)
BUN SERPL-MCNC: 19 MG/DL (ref 6–20)
BUN/CREAT SERPL: 19.6 (ref 7–25)
CALCIUM SPEC-SCNC: 10 MG/DL (ref 8.6–10.5)
CHLORIDE SERPL-SCNC: 98 MMOL/L (ref 98–107)
CO2 SERPL-SCNC: 27.8 MMOL/L (ref 22–29)
CREAT SERPL-MCNC: 0.97 MG/DL (ref 0.57–1)
DEPRECATED RDW RBC AUTO: 42.7 FL (ref 37–54)
EGFRCR SERPLBLD CKD-EPI 2021: 71.3 ML/MIN/1.73
EOSINOPHIL # BLD AUTO: 0.05 10*3/MM3 (ref 0–0.4)
EOSINOPHIL NFR BLD AUTO: 1.1 % (ref 0.3–6.2)
ERYTHROCYTE [DISTWIDTH] IN BLOOD BY AUTOMATED COUNT: 12.9 % (ref 12.3–15.4)
GLOBULIN UR ELPH-MCNC: 3.2 GM/DL
GLUCOSE SERPL-MCNC: 86 MG/DL (ref 65–99)
HCT VFR BLD AUTO: 45.1 % (ref 34–46.6)
HGB BLD-MCNC: 15 G/DL (ref 12–15.9)
IMM GRANULOCYTES # BLD AUTO: 0.01 10*3/MM3 (ref 0–0.05)
IMM GRANULOCYTES NFR BLD AUTO: 0.2 % (ref 0–0.5)
LYMPHOCYTES # BLD AUTO: 1.07 10*3/MM3 (ref 0.7–3.1)
LYMPHOCYTES NFR BLD AUTO: 23 % (ref 19.6–45.3)
MCH RBC QN AUTO: 29.7 PG (ref 26.6–33)
MCHC RBC AUTO-ENTMCNC: 33.3 G/DL (ref 31.5–35.7)
MCV RBC AUTO: 89.3 FL (ref 79–97)
MONOCYTES # BLD AUTO: 0.42 10*3/MM3 (ref 0.1–0.9)
MONOCYTES NFR BLD AUTO: 9 % (ref 5–12)
NEUTROPHILS NFR BLD AUTO: 3.1 10*3/MM3 (ref 1.7–7)
NEUTROPHILS NFR BLD AUTO: 66.5 % (ref 42.7–76)
NRBC BLD AUTO-RTO: 0 /100 WBC (ref 0–0.2)
PLATELET # BLD AUTO: 166 10*3/MM3 (ref 140–450)
PMV BLD AUTO: 12.4 FL (ref 6–12)
POTASSIUM SERPL-SCNC: 4.1 MMOL/L (ref 3.5–5.2)
PROT SERPL-MCNC: 7.6 G/DL (ref 6–8.5)
RBC # BLD AUTO: 5.05 10*6/MM3 (ref 3.77–5.28)
SODIUM SERPL-SCNC: 136 MMOL/L (ref 136–145)
TSH SERPL DL<=0.05 MIU/L-ACNC: 2.4 UIU/ML (ref 0.27–4.2)
WBC NRBC COR # BLD: 4.66 10*3/MM3 (ref 3.4–10.8)

## 2023-03-31 PROCEDURE — 36415 COLL VENOUS BLD VENIPUNCTURE: CPT

## 2023-03-31 PROCEDURE — 86038 ANTINUCLEAR ANTIBODIES: CPT

## 2023-03-31 PROCEDURE — 80050 GENERAL HEALTH PANEL: CPT

## 2023-04-03 LAB — ANA SER QL: NEGATIVE

## 2023-09-08 ENCOUNTER — TELEMEDICINE (OUTPATIENT)
Dept: FAMILY MEDICINE CLINIC | Facility: TELEHEALTH | Age: 53
End: 2023-09-08
Payer: COMMERCIAL

## 2023-09-08 DIAGNOSIS — U07.1 COVID-19: Primary | ICD-10-CM

## 2023-09-08 RX ORDER — BENZONATATE 100 MG/1
CAPSULE ORAL
Qty: 30 CAPSULE | Refills: 0 | Status: SHIPPED | OUTPATIENT
Start: 2023-09-08

## 2023-09-09 NOTE — PROGRESS NOTES
You have chosen to receive care through a telehealth visit.  Do you consent to use a video/audio connection for your medical care today? Yes     CHIEF COMPLAINT  Chief Complaint   Patient presents with    covid 19         HPI  Emiliana Garcia is a 53 y.o. female  presents with complaint of runny nose, cough. Reports she thought she had allergies but she tested and was positive for COVID 19. Reports her symptoms started 1 day ago. Reports no fever + chills. No nausea or vomiting. No CP or SOA. Reports no headache. + body aches. Reports she has taken IBU and allergy pills that helped alittle.     Review of Systems   Constitutional:  Positive for chills. Negative for fatigue and fever.   HENT:  Positive for congestion, rhinorrhea and sore throat. Negative for ear discharge, ear pain, sinus pressure and sinus pain.    Respiratory:  Positive for cough. Negative for chest tightness, shortness of breath and wheezing.    Cardiovascular:  Negative for chest pain.   Gastrointestinal:  Negative for abdominal pain, diarrhea, nausea and vomiting.   Musculoskeletal:  Positive for myalgias. Negative for back pain.   Neurological:  Positive for headaches. Negative for dizziness.   Psychiatric/Behavioral: Negative.       Past Medical History:   Diagnosis Date    Abnormal mammogram of left breast 9/10/2021    Allergic     Closed fracture of distal end of radius 9/12/2016    Closed fracture of distal end of tibia 4/25/2016    Closed fracture of styloid process of ulna 9/12/2016    Deep vein thrombosis     Elbow joint pain 9/12/2016    Fractures     Heart murmur     MVP    May-Thurner syndrome     Migraine     Mitral valve prolapse     Osteopenia     Pain in left foot 4/6/2016    Sprain of ankle 10/7/2016    Stress fracture of tibia 5/20/2016    Thrombocytopenia 9/9/2019    Vasomotor rhinitis        Family History   Problem Relation Age of Onset    Lupus Mother         skin involvement    Hypertension Father     Alzheimer's disease  Father     Prostate cancer Father     Skin cancer Father         melanoma    Lupus Sister     Thyroid disease Brother     Osteoporosis Maternal Grandmother     Alzheimer's disease Maternal Grandmother     Stroke Maternal Grandfather     Heart attack Paternal Grandmother     Hypertension Paternal Grandmother     Breast cancer Neg Hx     Ovarian cancer Neg Hx        Social History     Socioeconomic History    Marital status:    Tobacco Use    Smoking status: Never    Smokeless tobacco: Never   Vaping Use    Vaping Use: Never used   Substance and Sexual Activity    Alcohol use: Yes     Comment: a drink with special occasions, maybe once a month at most    Drug use: Never    Sexual activity: Yes     Partners: Male     Birth control/protection: Essure     Comment: With        Emiliana Garcia  reports that she has never smoked. She has never used smokeless tobacco.. I have educated her on the risk of diseases from using tobacco products such as cancer, COPD, and heart disease.         I spent  1  minutes counseling the patient.              Breastfeeding No     PHYSICAL EXAM  Physical Exam   Constitutional: She is oriented to person, place, and time. She appears well-developed and well-nourished. No distress.   HENT:   Head: Normocephalic and atraumatic.   Right Ear: Hearing normal.   Left Ear: Hearing normal.   Nose: Rhinorrhea and congestion present. Right sinus exhibits no maxillary sinus tenderness. Left sinus exhibits no maxillary sinus tenderness.   Mouth/Throat: Mouth/Lips are normal.Oropharynx is clear and moist.   Patient directed exam    Eyes: Conjunctivae and lids are normal.   Pulmonary/Chest: Effort normal.  No respiratory distress.  Neurological: She is alert and oriented to person, place, and time.   Psychiatric: She has a normal mood and affect. Her speech is normal and behavior is normal.     Results for orders placed or performed in visit on 03/31/23   Comprehensive Metabolic Panel     Specimen: Blood   Result Value Ref Range    Glucose 86 65 - 99 mg/dL    BUN 19 6 - 20 mg/dL    Creatinine 0.97 0.57 - 1.00 mg/dL    Sodium 136 136 - 145 mmol/L    Potassium 4.1 3.5 - 5.2 mmol/L    Chloride 98 98 - 107 mmol/L    CO2 27.8 22.0 - 29.0 mmol/L    Calcium 10.0 8.6 - 10.5 mg/dL    Total Protein 7.6 6.0 - 8.5 g/dL    Albumin 4.4 3.5 - 5.2 g/dL    ALT (SGPT) 14 1 - 33 U/L    AST (SGOT) 20 1 - 32 U/L    Alkaline Phosphatase 68 39 - 117 U/L    Total Bilirubin 0.6 0.0 - 1.2 mg/dL    Globulin 3.2 gm/dL    A/G Ratio 1.4 g/dL    BUN/Creatinine Ratio 19.6 7.0 - 25.0    Anion Gap 10.2 5.0 - 15.0 mmol/L    eGFR 71.3 >60.0 mL/min/1.73   SHAW    Specimen: Blood   Result Value Ref Range    SHAW Direct Negative Negative   TSH    Specimen: Blood   Result Value Ref Range    TSH 2.400 0.270 - 4.200 uIU/mL   CBC Auto Differential    Specimen: Blood   Result Value Ref Range    WBC 4.66 3.40 - 10.80 10*3/mm3    RBC 5.05 3.77 - 5.28 10*6/mm3    Hemoglobin 15.0 12.0 - 15.9 g/dL    Hematocrit 45.1 34.0 - 46.6 %    MCV 89.3 79.0 - 97.0 fL    MCH 29.7 26.6 - 33.0 pg    MCHC 33.3 31.5 - 35.7 g/dL    RDW 12.9 12.3 - 15.4 %    RDW-SD 42.7 37.0 - 54.0 fl    MPV 12.4 (H) 6.0 - 12.0 fL    Platelets 166 140 - 450 10*3/mm3    Neutrophil % 66.5 42.7 - 76.0 %    Lymphocyte % 23.0 19.6 - 45.3 %    Monocyte % 9.0 5.0 - 12.0 %    Eosinophil % 1.1 0.3 - 6.2 %    Basophil % 0.2 0.0 - 1.5 %    Immature Grans % 0.2 0.0 - 0.5 %    Neutrophils, Absolute 3.10 1.70 - 7.00 10*3/mm3    Lymphocytes, Absolute 1.07 0.70 - 3.10 10*3/mm3    Monocytes, Absolute 0.42 0.10 - 0.90 10*3/mm3    Eosinophils, Absolute 0.05 0.00 - 0.40 10*3/mm3    Basophils, Absolute 0.01 0.00 - 0.20 10*3/mm3    Immature Grans, Absolute 0.01 0.00 - 0.05 10*3/mm3    nRBC 0.0 0.0 - 0.2 /100 WBC       Diagnoses and all orders for this visit:    1. COVID-19 (Primary)    Other orders  -     benzonatate (Tessalon Perles) 100 MG capsule; Take 1 or 2 perles tid prn cough  Dispense: 30 capsule;  Refill: 0  -     Nirmatrelvir&Ritonavir 300/100 (PAXLOVID) 20 x 150 MG & 10 x 100MG tablet therapy pack tablet; Take 3 tablets by mouth 2 (Two) Times a Day for 5 days. Indications: COVID-19 Confirmed Infection  Dispense: 30 tablet; Refill: 0    Rest   Fluids  Discussed Paxlovid with patient. eGFR 71.3 (3-23). Patient medications reviewed and agrees to take the medication  PCP if symptoms persist   ER for worsening symptoms such as high fever, chest pain or SOA   Get O2 sat monitor if less than 92% go to ER    FOLLOW-UP  As discussed during visit with PCP/[facility] if no improvement or Urgent Care/Emergency Department if worsening of symptoms    Patient verbalizes understanding of medication dosage, comfort measures, instructions for treatment and follow-up.    RONNI Alberto  09/08/2023  21:05 EDT    The use of a video visit has been reviewed with the patient and verbal informed consent has been obtained. Myself and Emiliana Garcia participated in this visit. The patient is located in [address] [place] [address] [address] [facility].    I am located in [place], [place]. Mychart and Twilio were utilized. I spent 5 minutes in the patient's chart for this visit.

## 2023-09-09 NOTE — PATIENT INSTRUCTIONS
Cough, Adult  Coughing is a reflex that clears your throat and your airways (respiratory system). Coughing helps to heal and protect your lungs. It is normal to cough occasionally, but a cough that happens with other symptoms or lasts a long time may be a sign of a condition that needs treatment. An acute cough may only last 2-3 weeks, while a chronic cough may last 8 or more weeks.  Coughing is commonly caused by:  Infection of the respiratory systemby viruses or bacteria.  Breathing in substances that irritate your lungs.  Allergies.  Asthma.  Mucus that runs down the back of your throat (postnasal drip).  Smoking.  Acid backing up from the stomach into the esophagus (gastroesophageal reflux).  Certain medicines.  Chronic lung problems.  Other medical conditions such as heart failure or a blood clot in the lung (pulmonary embolism).  Follow these instructions at home:  Medicines  Take over-the-counter and prescription medicines only as told by your health care provider.  Talk with your health care provider before you take a cough suppressant medicine.  Lifestyle    Avoid cigarette smoke. Do not use any products that contain nicotine or tobacco, such as cigarettes, e-cigarettes, and chewing tobacco. If you need help quitting, ask your health care provider.  Drink enough fluid to keep your urine pale yellow.  Avoid caffeine.  Do not drink alcohol if your health care provider tells you not to drink.  General instructions    Pay close attention to changes in your cough. Tell your health care provider about them.  Always cover your mouth when you cough.  Avoid things that make you cough, such as perfume, candles, cleaning products, or campfire or tobacco smoke.  If the air is dry, use a cool mist vaporizer or humidifier in your bedroom or your home to help loosen secretions.  If your cough is worse at night, try to sleep in a semi-upright position.  Rest as needed.  Keep all follow-up visits as told by your health care  provider. This is important.  Contact a health care provider if you:  Have new symptoms.  Cough up pus.  Have a cough that does not get better after 2-3 weeks or gets worse.  Cannot control your cough with cough suppressant medicines and you are losing sleep.  Have pain that gets worse or pain that is not helped with medicine.  Have a fever.  Have unexplained weight loss.  Have night sweats.  Get help right away if:  You cough up blood.  You have difficulty breathing.  Your heartbeat is very fast.  These symptoms may represent a serious problem that is an emergency. Do not wait to see if the symptoms will go away. Get medical help right away. Call your local emergency services (911 in the [place] Do not drive yourself to the hospital.  Summary  Coughing is a reflex that clears your throat and your airways. It is normal to cough occasionally, but a cough that happens with other symptoms or lasts a long time may be a sign of a condition that needs treatment.  Take over-the-counter and prescription medicines only as told by your health care provider.  Always cover your mouth when you cough.  Contact a health care provider if you have new symptoms or a cough that does not get better after 2-3 weeks or gets worse.  This information is not intended to replace advice given to you by your health care provider. Make sure you discuss any questions you have with your health care provider.  Document Revised: 01/06/2020 Document Reviewed: 01/06/2020  [employer] Patient Education © 2023 [employer]  How to Quarantine at Home  Information for Patients and Families    These instructions are for people with confirmed or suspected COVID-19 who do not need to be hospitalized and those with confirmed COVID-19 who were hospitalized and discharged to care for themselves at home.    If you were tested through the Health Department  The Health Department will monitor your wellbeing.  If it is determined that you do not need to be  hospitalized and can be isolated at home, you will be monitored by staff from your local or state health department.     If you were tested through a Commercial Lab  You will need to monitor yourself and report changes in your symptoms to your doctor.  See the section below called Monitor Your Symptoms.    Follow these steps until a healthcare provider or local or state health department says you can return to your normal activities.    Stay home except to get medical care  Restrict activities outside your home, except for getting medical care.   Do not go to work, school, or public areas.   Avoid using public transportation, ride-sharing, or taxis.    Separate yourself from other people and animals in your home  People  As much as possible, you should stay in a specific room and away from other people in your home. Also, you should use a separate bathroom, if available.    Animals  You should restrict contact with pets and other animals while you are sick with COVID-19, just like you would around other people. When possible, have another member of your household care for your animals while you are sick. If you are sick with COVID-19, avoid contact with your pet, including petting, snuggling, being kissed or licked, and sharing food. If you must care for your pet or be around animals while you are sick, wash your hands before and after you interact with pets and wear a facemask. See COVID-19 and Animals for more information.    Call ahead before visiting your doctor  If you have a medical appointment, call the healthcare provider and tell them that you have or may have COVID-19. This information will help the healthcare provider’s office take steps to keep other people from getting infected or exposed.    Wear a facemask  You should wear a facemask when you are around other people (e.g., sharing a room or vehicle) or pets and before you enter a healthcare provider’s office.     If you are not able to wear a facemask  (for example, because it causes trouble breathing), then people who live with you should not stay in the same room with you, or they should wear a facemask if they enter your room.    Cover your coughs and sneezes  Cover your mouth and nose with a tissue when you cough or sneeze.   Throw used tissues in a lined trash can.   Immediately wash your hands with soap and water for at least 20 seconds or, if soap and water are not available, clean your hands with an alcohol-based hand  that contains at least 60% alcohol.    Clean your hands often  Wash your hands often with soap and water for at least 20 seconds, especially after blowing your nose, coughing, or sneezing; going to the bathroom; and before eating or preparing food.     If soap and water are not readily available, use an alcohol-based hand  with at least 60% alcohol, covering all surfaces of your hands and rubbing them together until they feel dry.    Soap and water are the best option if hands are visibly dirty. Avoid touching your eyes, nose, and mouth with unwashed hands.    Avoid sharing personal household items  You should not share dishes, drinking glasses, cups, eating utensils, towels, or bedding with other people or pets in your home.   After using these items, they should be washed thoroughly with soap and water.    Clean all “high-touch” surfaces everyday  High touch surfaces include counters, tabletops, doorknobs, bathroom fixtures, toilets, phones, keyboards, tablets, and bedside tables.   Also, clean any surfaces that may have blood, stool, or body fluids on them.   Use a household cleaning spray or wipe, according to the label instructions. Labels contain instructions for safe and effective use of the cleaning product, including precautions you should take when applying the product, such as wearing gloves and making sure you have good ventilation during use of the product.    Monitor your symptoms  Seek prompt medical  attention if your illness is worsening (e.g., difficulty breathing).   Before seeking care, call your healthcare provider and tell them that you have, or are being evaluated for, COVID-19.   Put on a facemask before you enter the facility.     These steps will help the healthcare provider’s office to keep other people in the office or waiting room from getting infected or exposed.   Persons who are placed under active monitoring or facilitated self-monitoring should follow instructions provided by their local health department or occupational health professionals, as appropriate.  If you have a medical emergency and need to call 911, notify the dispatch personnel that you have, or are being evaluated for COVID-19. If possible, put on a facemask before emergency medical services arrive.    Discontinuing home isolation  Patients with confirmed COVID-19 should remain under home isolation precautions until the risk of secondary transmission to others is thought to be low. The decision to discontinue home isolation precautions should be made on a case-by-case basis, in consultation with healthcare providers and state and local health departments.    The below content are for household members, intimate partners, and caregivers of a patient with symptomatic laboratory-confirmed COVID-19 or a patient under investigation:    Household members, intimate partners, and caregivers may have close contact with a person with symptomatic, laboratory-confirmed COVID-19 or a person under investigation.     Close contacts should monitor their health; they should call their healthcare provider right away if they develop symptoms suggestive of COVID-19 (e.g., fever, cough, shortness of breath)     Close contacts should also follow these recommendations:  Make sure that you understand and can help the patient follow their healthcare provider’s instructions for medication(s) and care. You should help the patient with basic needs in the  home and provide support for getting groceries, prescriptions, and other personal needs.  Monitor the patient’s symptoms. If the patient is getting sicker, call his or her healthcare provider and tell them that the patient has laboratory-confirmed COVID-19. This will help the healthcare provider’s office take steps to keep other people in the office or waiting room from getting infected. Ask the healthcare provider to call the local or [facility] health department for additional guidance. If the patient has a medical emergency and you need to call 911, notify the dispatch personnel that the patient has, or is being evaluated for COVID-19.  Household members should stay in another room or be  from the patient as much as possible. Household members should use a separate bedroom and bathroom, if available.  Prohibit visitors who do not have an essential need to be in the home.  Household members should care for any pets in the home. Do not handle pets or other animals while sick.  For more information, see COVID-19 and Animals.  Make sure that shared spaces in the home have good air flow, such as by an air conditioner or an opened window, weather permitting.  Perform hand hygiene frequently. Wash your hands often with soap and water for at least 20 seconds or use an alcohol-based hand  that contains 60 to 95% alcohol, covering all surfaces of your hands and rubbing them together until they feel dry. Soap and water should be used preferentially if hands are visibly dirty.  Avoid touching your eyes, nose, and mouth with unwashed hands.  The patient should wear a facemask when you are around other people. If the patient is not able to wear a facemask (for example, because it causes trouble breathing), you, as the caregiver, should wear a mask when you are in the same room as the patient.  Wear a disposable facemask and gloves when you touch or have contact with the patient’s blood, stool, or body fluids, such  as saliva, sputum, nasal mucus, vomit, or urine.   Throw out disposable facemasks and gloves after using them. Do not reuse.  When removing personal protective equipment, first remove and dispose of gloves. Then, immediately clean your hands with soap and water or alcohol-based hand . Next, remove and dispose of facemask, and immediately clean your hands again with soap and water or alcohol-based hand .  Avoid sharing household items with the patient. You should not share dishes, drinking glasses, cups, eating utensils, towels, bedding, or other items. After the patient uses these items, you should wash them thoroughly (see below “Wash laundry thoroughly”).  Clean all “high-touch” surfaces, such as counters, tabletops, doorknobs, bathroom fixtures, toilets, phones, keyboards, tablets, and bedside tables, every day. Also, clean any surfaces that may have blood, stool, or body fluids on them.   Use a household cleaning spray or wipe, according to the label instructions. Labels contain instructions for safe and effective use of the cleaning product including precautions you should take when applying the product, such as wearing gloves and making sure you have good ventilation during use of the product.  Wash laundry thoroughly.   Immediately remove and wash clothes or bedding that have blood, stool, or body fluids on them.  Wear disposable gloves while handling soiled items and keep soiled items away from your body. Clean your hands (with soap and water or an alcohol-based hand ) immediately after removing your gloves.  Read and follow directions on labels of laundry or clothing items and detergent. In general, using a normal laundry detergent according to washing machine instructions and dry thoroughly using the warmest temperatures recommended on the clothing label.  Place all used disposable gloves, facemasks, and other contaminated items in a lined container before disposing of them with  other household waste. Clean your hands (with soap and water or an alcohol-based hand ) immediately after handling these items. Soap and water should be used preferentially if hands are visibly dirty.  Discuss any additional questions with your state or local health department or healthcare provider.    Adapted from information provided by the Centers for Disease Control and Prevention.  For more information, visit https://www.cdc.gov/coronavirus/2019-ncov/hcp/guidance-prevent-spread.html

## 2023-09-11 ENCOUNTER — TELEPHONE (OUTPATIENT)
Dept: INTERNAL MEDICINE | Facility: CLINIC | Age: 53
End: 2023-09-11
Payer: COMMERCIAL

## 2023-09-11 ENCOUNTER — TRANSCRIBE ORDERS (OUTPATIENT)
Dept: ADMINISTRATIVE | Facility: HOSPITAL | Age: 53
End: 2023-09-11
Payer: COMMERCIAL

## 2023-09-11 DIAGNOSIS — Z12.31 VISIT FOR SCREENING MAMMOGRAM: Primary | ICD-10-CM

## 2023-09-11 NOTE — TELEPHONE ENCOUNTER
Called pt and she stated that the cough has stopped some but she was concerned that if she wasn't coughing up the mucous in her throat should she still be taking the Perles and the paxlovid. I advised pt is is not usually advisable to stop a medication like these for any reason other than a major reaction which does not sound like what is going on. Pt stated no further questions at this time.

## 2023-09-11 NOTE — TELEPHONE ENCOUNTER
Caller: Emiliana Garcia    Relationship: Self    Best call back number: [phone]     What is the best time to reach you: ANYTIME    Who are you requesting to speak with (clinical staff, provider,  specific staff member): PCP/MA        What was the call regarding: PATIENT TESTED POSITIVE FOR COVID ON FRIDAY AND HAD A TELEHEALTH ON FRIDAY EVENING. SHE WAS PRESCRIBED PAXLOVID AND MEDICATION FOR COUGH(PEARLS). PATIENT STATED SHE IS CONCERNED THAT THE MEDICATION IS MAKING HER FEEL WORSE. SHE STATED HER BACK FEELS VERY VERY BAD AND HER RIBS. .SHE HAS QUESTIONS AND IS REQUESTING A CALLBACK. PATIENT IS DUE TO TAKE HER NEXT DOSE AT 11AM AND IF SHE CAN BE CALLED BACK PRIOR TO THAT     Is it okay if the provider responds through MyChart: CALLBACK

## 2023-10-13 ENCOUNTER — OFFICE VISIT (OUTPATIENT)
Dept: INTERNAL MEDICINE | Facility: CLINIC | Age: 53
End: 2023-10-13
Payer: COMMERCIAL

## 2023-10-13 ENCOUNTER — LAB (OUTPATIENT)
Dept: LAB | Facility: HOSPITAL | Age: 53
End: 2023-10-13
Payer: COMMERCIAL

## 2023-10-13 VITALS
SYSTOLIC BLOOD PRESSURE: 108 MMHG | OXYGEN SATURATION: 99 % | DIASTOLIC BLOOD PRESSURE: 62 MMHG | WEIGHT: 167 LBS | BODY MASS INDEX: 22.62 KG/M2 | HEART RATE: 104 BPM | TEMPERATURE: 97.2 F | HEIGHT: 72 IN

## 2023-10-13 DIAGNOSIS — E78.2 MODERATE MIXED HYPERLIPIDEMIA NOT REQUIRING STATIN THERAPY: ICD-10-CM

## 2023-10-13 DIAGNOSIS — G43.909 MIGRAINE WITHOUT STATUS MIGRAINOSUS, NOT INTRACTABLE, UNSPECIFIED MIGRAINE TYPE: ICD-10-CM

## 2023-10-13 DIAGNOSIS — Z00.00 ANNUAL PHYSICAL EXAM: Primary | ICD-10-CM

## 2023-10-13 DIAGNOSIS — Z00.00 ANNUAL PHYSICAL EXAM: ICD-10-CM

## 2023-10-13 DIAGNOSIS — J30.9 ALLERGIC RHINITIS, UNSPECIFIED SEASONALITY, UNSPECIFIED TRIGGER: ICD-10-CM

## 2023-10-13 DIAGNOSIS — M85.852 OSTEOPENIA OF LEFT HIP: ICD-10-CM

## 2023-10-13 DIAGNOSIS — I87.1 MAY-THURNER SYNDROME: ICD-10-CM

## 2023-10-13 DIAGNOSIS — J30.0 VASOMOTOR RHINITIS: ICD-10-CM

## 2023-10-13 DIAGNOSIS — L23.2 ALLERGIC CONTACT DERMATITIS DUE TO COSMETICS: ICD-10-CM

## 2023-10-13 DIAGNOSIS — I34.1 MITRAL VALVE PROLAPSE: ICD-10-CM

## 2023-10-13 LAB
25(OH)D3 SERPL-MCNC: 41.1 NG/ML (ref 30–100)
ALBUMIN SERPL-MCNC: 4.8 G/DL (ref 3.5–5.2)
ALBUMIN/GLOB SERPL: 1.7 G/DL
ALP SERPL-CCNC: 72 U/L (ref 39–117)
ALT SERPL W P-5'-P-CCNC: 17 U/L (ref 1–33)
ANION GAP SERPL CALCULATED.3IONS-SCNC: 14.9 MMOL/L (ref 5–15)
AST SERPL-CCNC: 26 U/L (ref 1–32)
BILIRUB SERPL-MCNC: 0.6 MG/DL (ref 0–1.2)
BUN SERPL-MCNC: 18 MG/DL (ref 6–20)
BUN/CREAT SERPL: 17.1 (ref 7–25)
CALCIUM SPEC-SCNC: 10.3 MG/DL (ref 8.6–10.5)
CHLORIDE SERPL-SCNC: 101 MMOL/L (ref 98–107)
CHOLEST SERPL-MCNC: 181 MG/DL (ref 0–200)
CO2 SERPL-SCNC: 24.1 MMOL/L (ref 22–29)
CREAT SERPL-MCNC: 1.05 MG/DL (ref 0.57–1)
DEPRECATED RDW RBC AUTO: 41.7 FL (ref 37–54)
EGFRCR SERPLBLD CKD-EPI 2021: 64.5 ML/MIN/1.73
ERYTHROCYTE [DISTWIDTH] IN BLOOD BY AUTOMATED COUNT: 13 % (ref 12.3–15.4)
GLOBULIN UR ELPH-MCNC: 2.8 GM/DL
GLUCOSE SERPL-MCNC: 60 MG/DL (ref 65–99)
HBA1C MFR BLD: 5.3 % (ref 4.8–5.6)
HCT VFR BLD AUTO: 44.4 % (ref 34–46.6)
HDLC SERPL-MCNC: 64 MG/DL (ref 40–60)
HGB BLD-MCNC: 14.9 G/DL (ref 12–15.9)
LDLC SERPL CALC-MCNC: 101 MG/DL (ref 0–100)
LDLC/HDLC SERPL: 1.56 {RATIO}
MCH RBC QN AUTO: 29.8 PG (ref 26.6–33)
MCHC RBC AUTO-ENTMCNC: 33.6 G/DL (ref 31.5–35.7)
MCV RBC AUTO: 88.8 FL (ref 79–97)
PLATELET # BLD AUTO: 177 10*3/MM3 (ref 140–450)
PMV BLD AUTO: 12.4 FL (ref 6–12)
POTASSIUM SERPL-SCNC: 4.7 MMOL/L (ref 3.5–5.2)
PROT SERPL-MCNC: 7.6 G/DL (ref 6–8.5)
RBC # BLD AUTO: 5 10*6/MM3 (ref 3.77–5.28)
SODIUM SERPL-SCNC: 140 MMOL/L (ref 136–145)
TRIGL SERPL-MCNC: 87 MG/DL (ref 0–150)
TSH SERPL DL<=0.05 MIU/L-ACNC: 1.12 UIU/ML (ref 0.27–4.2)
VLDLC SERPL-MCNC: 16 MG/DL (ref 5–40)
WBC NRBC COR # BLD: 6.52 10*3/MM3 (ref 3.4–10.8)

## 2023-10-13 PROCEDURE — 83036 HEMOGLOBIN GLYCOSYLATED A1C: CPT

## 2023-10-13 PROCEDURE — 80050 GENERAL HEALTH PANEL: CPT

## 2023-10-13 PROCEDURE — 80061 LIPID PANEL: CPT

## 2023-10-13 PROCEDURE — 99396 PREV VISIT EST AGE 40-64: CPT | Performed by: INTERNAL MEDICINE

## 2023-10-13 PROCEDURE — 82306 VITAMIN D 25 HYDROXY: CPT

## 2023-10-13 NOTE — PROGRESS NOTES
Internal Medicine Annual Exam  Emiliana Garcia is a 53 y.o. female who presents today for an annual exam and with concerns as outlined below.    Chief Complaint  Chief Complaint   Patient presents with    Annual Exam        HPI  Ms. Garcia comes in today for her physical. She notes several issues that have arisen over the last year. Her mother had a stroke and was diagnosed with alzheimers. She is splitting care with her sister. She had a severe skin reaction to facial lotion/. She is seeing Dr. Cooley who did labs with normal TSH, SHAW, CBC. Plan for patch testing in December. The erythema has improved but has not resolved. She notes that her mother in law passed away and this put a strain on her . She then had COVID19 for the first time in September. She had gotten a COVID booster a few days prior and believes she was in contact with someone who was ill at that time. She has not yet had the newest COVID vaccine and will not be due until end of October. She defers flu shot today due to plan for travel. She continues to do half marathons, walking each morning, today 7 miles. She is up to date with dental, vision, skin exam. She had colonoscopy in January, next in 10y. Mammogram is scheduled in December. DEXA is due. Denies use of tobacco, ecigarettes, illicit drugs, and drinks very minimal alcohol.           Review of Systems  Review of Systems   Constitutional: Negative.    HENT:  Negative for dental problem and hearing loss.    Eyes: Negative.    Respiratory: Negative.     Cardiovascular: Negative.    Gastrointestinal: Negative.    Genitourinary: Negative.    Musculoskeletal:  Positive for arthralgias (R hip). Negative for gait problem and myalgias.   Skin:  Positive for color change and rash.   Allergic/Immunologic: Positive for environmental allergies.   Neurological: Negative.    Psychiatric/Behavioral:  Positive for stress. Negative for depressed mood.         Past Medical History  Past Medical  History:   Diagnosis Date    Abnormal mammogram of left breast 09/10/2021    Allergic     Closed fracture of distal end of radius 09/12/2016    Closed fracture of distal end of tibia 04/25/2016    Closed fracture of styloid process of ulna 09/12/2016    Deep vein thrombosis     Elbow joint pain 09/12/2016    Fractures     Heart murmur     MVP    May-Thurner syndrome     Migraine     Mitral valve prolapse     Osteopenia     Pain in joint involving ankle and foot 04/06/2016    From Automated Load;Provider: Carlton Muñoz;Status: Active    Pain in left foot 04/06/2016    Sprain of ankle 10/07/2016    Stress fracture of tibia 05/20/2016    Thrombocytopenia 09/09/2019    Vasomotor rhinitis         Surgical History  Past Surgical History:   Procedure Laterality Date    AUGMENTATION MAMMAPLASTY      2012    BREAST BIOPSY Left 08/05/2021    COLONOSCOPY  1-    One benign hyperplastic polyp    ENDOMETRIAL ABLATION      Uterine ablation to alleviate heavy periods    FRACTURE SURGERY      Repair broken left wrist with titanium plate and 8 screws    WRIST FRACTURE SURGERY Left 2016        Family History  Family History   Problem Relation Age of Onset    Lupus Mother         skin involvement    Hypertension Father     Alzheimer's disease Father     Prostate cancer Father     Skin cancer Father         melanoma    Lupus Sister     Thyroid disease Brother     Osteoporosis Maternal Grandmother     Alzheimer's disease Maternal Grandmother     Stroke Maternal Grandfather     Heart attack Paternal Grandmother     Hypertension Paternal Grandmother     Breast cancer Neg Hx     Ovarian cancer Neg Hx         Social History  Social History     Socioeconomic History    Marital status:    Tobacco Use    Smoking status: Never    Smokeless tobacco: Never   Vaping Use    Vaping Use: Never used   Substance and Sexual Activity    Alcohol use: Yes     Comment: a drink with special occasions, maybe once a month at most    Drug use: Never  "   Sexual activity: Yes     Partners: Male     Birth control/protection: Essure     Comment: With         Current Medications  Current Outpatient Medications on File Prior to Visit   Medication Sig Dispense Refill    benzonatate (Tessalon Perles) 100 MG capsule Take 1 or 2 perles tid prn cough 30 capsule 0    ipratropium (ATROVENT) 0.03 % nasal spray 2 sprays into the nostril(s) as directed by provider Daily As Needed.      loratadine (CLARITIN) 10 MG tablet Take 1 tablet by mouth Every Other Day.      Montelukast Sodium (SINGULAIR PO) Take 10 mg by mouth Every Other Day.      NASAL SALINE NA into the nostril(s) as directed by provider 2 (Two) Times a Day.      Omega-3 Fatty Acids (fish oil) 1000 MG capsule capsule Take 1 capsule by mouth Daily With Breakfast.      spironolactone (ALDACTONE) 50 MG tablet Take 1 tablet by mouth 2 (Two) Times a Day.  1     No current facility-administered medications on file prior to visit.       Allergies  Allergies   Allergen Reactions    Bactrim [Sulfamethoxazole-Trimethoprim] Itching     All over itching, chills, jittery    Erythromycin Other (See Comments)     Stomach pains, rash    Hydrocodone Other (See Comments)    Lorabid [Loracarbef] Other (See Comments)     Stomach pains, rash      Penicillins Other (See Comments)     Allergy unknown, happened as a child        Objective  Visit Vitals  /62   Pulse 104   Temp 97.2 øF (36.2 øC)   Ht 185.4 cm (73\")   Wt 75.8 kg (167 lb)   SpO2 99%   BMI 22.03 kg/mý        Physical Exam  Physical Exam  Vitals and nursing note reviewed.   Constitutional:       General: She is not in acute distress.     Appearance: Normal appearance. She is well-developed and normal weight. She is not ill-appearing, toxic-appearing or diaphoretic.   HENT:      Head: Normocephalic and atraumatic.      Right Ear: Tympanic membrane, ear canal and external ear normal.      Left Ear: Tympanic membrane, ear canal and external ear normal.      Nose: Nose " normal.   Eyes:      General: No scleral icterus.     Conjunctiva/sclera: Conjunctivae normal.   Neck:      Thyroid: No thyromegaly.   Cardiovascular:      Rate and Rhythm: Normal rate and regular rhythm.      Heart sounds: Normal heart sounds. No murmur heard.  Pulmonary:      Effort: Pulmonary effort is normal. No respiratory distress.      Breath sounds: Normal breath sounds.   Abdominal:      General: There is no distension.      Palpations: Abdomen is soft. There is no mass.      Tenderness: There is no abdominal tenderness.   Musculoskeletal:         General: No deformity.      Cervical back: Neck supple.      Right lower leg: No edema.      Left lower leg: No edema.   Lymphadenopathy:      Cervical: No cervical adenopathy.   Skin:     General: Skin is warm and dry.      Findings: Erythema and rash present.      Comments: Facial erythema which is most pronounced on chin and cheeks   Neurological:      Mental Status: She is alert and oriented to person, place, and time. Mental status is at baseline.      Gait: Gait normal.   Psychiatric:         Mood and Affect: Mood normal.         Behavior: Behavior normal.         Thought Content: Thought content normal.         Judgment: Judgment normal.          Results  Results for orders placed or performed in visit on 03/31/23   Comprehensive Metabolic Panel    Specimen: Blood   Result Value Ref Range    Glucose 86 65 - 99 mg/dL    BUN 19 6 - 20 mg/dL    Creatinine 0.97 0.57 - 1.00 mg/dL    Sodium 136 136 - 145 mmol/L    Potassium 4.1 3.5 - 5.2 mmol/L    Chloride 98 98 - 107 mmol/L    CO2 27.8 22.0 - 29.0 mmol/L    Calcium 10.0 8.6 - 10.5 mg/dL    Total Protein 7.6 6.0 - 8.5 g/dL    Albumin 4.4 3.5 - 5.2 g/dL    ALT (SGPT) 14 1 - 33 U/L    AST (SGOT) 20 1 - 32 U/L    Alkaline Phosphatase 68 39 - 117 U/L    Total Bilirubin 0.6 0.0 - 1.2 mg/dL    Globulin 3.2 gm/dL    A/G Ratio 1.4 g/dL    BUN/Creatinine Ratio 19.6 7.0 - 25.0    Anion Gap 10.2 5.0 - 15.0 mmol/L    eGFR  71.3 >60.0 mL/min/1.73   SHAW    Specimen: Blood   Result Value Ref Range    SHAW Direct Negative Negative   TSH    Specimen: Blood   Result Value Ref Range    TSH 2.400 0.270 - 4.200 uIU/mL   CBC Auto Differential    Specimen: Blood   Result Value Ref Range    WBC 4.66 3.40 - 10.80 10*3/mm3    RBC 5.05 3.77 - 5.28 10*6/mm3    Hemoglobin 15.0 12.0 - 15.9 g/dL    Hematocrit 45.1 34.0 - 46.6 %    MCV 89.3 79.0 - 97.0 fL    MCH 29.7 26.6 - 33.0 pg    MCHC 33.3 31.5 - 35.7 g/dL    RDW 12.9 12.3 - 15.4 %    RDW-SD 42.7 37.0 - 54.0 fl    MPV 12.4 (H) 6.0 - 12.0 fL    Platelets 166 140 - 450 10*3/mm3    Neutrophil % 66.5 42.7 - 76.0 %    Lymphocyte % 23.0 19.6 - 45.3 %    Monocyte % 9.0 5.0 - 12.0 %    Eosinophil % 1.1 0.3 - 6.2 %    Basophil % 0.2 0.0 - 1.5 %    Immature Grans % 0.2 0.0 - 0.5 %    Neutrophils, Absolute 3.10 1.70 - 7.00 10*3/mm3    Lymphocytes, Absolute 1.07 0.70 - 3.10 10*3/mm3    Monocytes, Absolute 0.42 0.10 - 0.90 10*3/mm3    Eosinophils, Absolute 0.05 0.00 - 0.40 10*3/mm3    Basophils, Absolute 0.01 0.00 - 0.20 10*3/mm3    Immature Grans, Absolute 0.01 0.00 - 0.05 10*3/mm3    nRBC 0.0 0.0 - 0.2 /100 WBC        Assessment and Plan  Diagnoses and all orders for this visit:    Annual physical exam  - Counseling was given to patient for the following topics:  appropriate exercise, disease prevention, importance of self breast exam and breast health, importance of immunizations, including risks and benefits, and bone health. Also discussed the importance of regular dental and vision care, as well recommendation for a yearly screening skin exam.    May-Thurner syndrome  - Affecting LLE, no history of DVT and no signs of clot today  - Stable    Migraine without status migrainosus, not intractable, unspecified migraine type  - stable    Mitral valve prolapse  - records with echo from 2009 showing mild holosystolic mitral valve prolapse and trace MR/TR.   - Clinically asymptomatic. No significant murmur on  exam.  - Monitor    Moderate mixed hyperlipidemia not requiring statin therapy  - On fish oil with stable LDL 130s  - recheck lipid panel    Osteopenia of left hip  - DEXA 9/2021 with osteopenia of L hip and femoral neck. Improving BMD.  - Continue weight bearing exercise. Has not required vitamin D supplement for a few years, will check level.  - DEXA ordered     Vasomotor rhinitis  - Follows with ENT and on ipratropium nasal spray     Allergic rhinitis, unspecified seasonality, unspecified trigger   - Follows with ENT, on singulair and claritin    Allergic contact dermatitis due to cosmetics   - significant facial erythema felt to be related to cosmetics. Overall improved but not resolved.  - had negative SHAW  - following with Dr. Cooley and plan for patch testing in December    Health Maintenance   Topic Date Due    PAP SMEAR  02/01/2022    INFLUENZA VACCINE  08/01/2023    DXA SCAN  09/24/2023    ANNUAL PHYSICAL  09/29/2023    LIPID PANEL  09/29/2023    COVID-19 Vaccine (6 - 2023-24 season) 10/30/2023    MAMMOGRAM  12/13/2023    TDAP/TD VACCINES (3 - Td or Tdap) 09/29/2032    COLORECTAL CANCER SCREENING  01/16/2033    HEPATITIS C SCREENING  Completed    ZOSTER VACCINE  Completed    Pneumococcal Vaccine 0-64  Aged Out     Health Maintenance  - Pap smear: UTD, [facility]  - Mammogram: scheduled 12/2023  - Colonoscopy: 1/2023, due 2033  - HCV: negative  - Immunizations: Tdap 9/2022. Shingrix complete. COVID due 10/30/2023. Flu discussed.    Return in about 1 year (around 10/13/2024) for Annual, Labs today.

## 2023-12-01 ENCOUNTER — HOSPITAL ENCOUNTER (OUTPATIENT)
Dept: BONE DENSITY | Facility: HOSPITAL | Age: 53
Discharge: HOME | End: 2023-12-01
Admitting: INTERNAL MEDICINE
Payer: COMMERCIAL

## 2023-12-01 DIAGNOSIS — M85.852 OSTEOPENIA OF LEFT HIP: ICD-10-CM

## 2023-12-01 PROCEDURE — 77080 DXA BONE DENSITY AXIAL: CPT

## 2023-12-15 ENCOUNTER — HOSPITAL ENCOUNTER (OUTPATIENT)
Dept: MAMMOGRAPHY | Facility: HOSPITAL | Age: 53
Discharge: HOME | End: 2023-12-15
Admitting: OBSTETRICS & GYNECOLOGY
Payer: COMMERCIAL

## 2023-12-15 DIAGNOSIS — Z12.31 VISIT FOR SCREENING MAMMOGRAM: ICD-10-CM

## 2023-12-15 PROCEDURE — 77067 SCR MAMMO BI INCL CAD: CPT

## 2023-12-15 PROCEDURE — 77063 BREAST TOMOSYNTHESIS BI: CPT

## 2024-01-01 PROBLEM — M81.0 AGE-RELATED OSTEOPOROSIS WITHOUT CURRENT PATHOLOGICAL FRACTURE: Status: ACTIVE | Noted: 2019-09-09

## 2024-05-13 ENCOUNTER — OFFICE VISIT (OUTPATIENT)
Dept: INTERNAL MEDICINE | Facility: CLINIC | Age: 54
End: 2024-05-13
Payer: COMMERCIAL

## 2024-05-13 VITALS
WEIGHT: 177.6 LBS | BODY MASS INDEX: 24.06 KG/M2 | HEART RATE: 76 BPM | TEMPERATURE: 97.6 F | RESPIRATION RATE: 22 BRPM | SYSTOLIC BLOOD PRESSURE: 112 MMHG | HEIGHT: 72 IN | OXYGEN SATURATION: 98 % | DIASTOLIC BLOOD PRESSURE: 78 MMHG

## 2024-05-13 DIAGNOSIS — G89.29 CHRONIC RIGHT-SIDED LOW BACK PAIN WITHOUT SCIATICA: Primary | ICD-10-CM

## 2024-05-13 DIAGNOSIS — M54.50 CHRONIC RIGHT-SIDED LOW BACK PAIN WITHOUT SCIATICA: Primary | ICD-10-CM

## 2024-05-13 DIAGNOSIS — M25.651 DECREASED MOBILITY OF JOINT OF RIGHT SIDE OF PELVIS: ICD-10-CM

## 2024-05-13 PROCEDURE — 99213 OFFICE O/P EST LOW 20 MIN: CPT | Performed by: NURSE PRACTITIONER

## 2024-05-13 NOTE — PROGRESS NOTES
Office Note     Name: Emiliana Garcia    : 1970     MRN: [id number]     Chief Complaint  Hip Pain    Subjective     History of Present Illness:  Emiliana Garcia is a 53 y.o. female who presents today for evaluation of right hip/low back pain.  Patient reports the pain is predominantly to her posterior right hip/low back area.  She reports the pain and discomfort has been present since February and seems to be worsening.  She reports in the morning after awakening she is not experiencing any pain but the pain develops throughout the day she is sitting more.  She feels her symptoms are gradually worsening.  She normally walks marathons and did 1 most recently within the past 2 weeks.  She initially thought her discomfort was related to training for her marathons.  She is now concerned and would like to have her complaints evaluated.  She reports occasional sharp shooting pain in her right buttock area with certain or sudden movements.  She also notes needing assistance when getting up from the toilet and often has to push herself up using the sink or other objects.  She denies any numbness or tingling to the right lower extremity.  She denies any bowel or bladder dysfunction.  She denies any known injury.  She does get regular massages to her psoas muscle as well as hip flexors.  She has taken some ibuprofen and Aleve which have been somewhat helpful.  She also has used ice and heat.  She feels ice is more helpful as it helps to numb the area.  No further complaints or concerns at this time.  Patient follows with Dr. Hall for chronic conditions.        Past Medical History:   Diagnosis Date    Abnormal mammogram of left breast 09/10/2021    Allergic     Closed fracture of distal end of radius 2016    Closed fracture of distal end of tibia 2016    Closed fracture of styloid process of ulna 2016    Deep vein thrombosis     Elbow joint pain 2016    Fractures     Heart murmur     MVP     History of medical problems     Chronic pain right hip    May-Thurner syndrome     Migraine     Mitral valve prolapse     Osteopenia     Pain in joint involving ankle and foot 04/06/2016    From Automated Load;Provider: Carlton Muñoz;Status: Active    Pain in left foot 04/06/2016    Sprain of ankle 10/07/2016    Stress fracture of tibia 05/20/2016    Thrombocytopenia 09/09/2019    Vasomotor rhinitis     Visual impairment     History of dry eyes, cornea scratches, staph marginal ulcers       Past Surgical History:   Procedure Laterality Date    AUGMENTATION MAMMAPLASTY      2012    BREAST BIOPSY Left 08/05/2021    COLONOSCOPY  1-    One benign hyperplastic polyp    ENDOMETRIAL ABLATION      Uterine ablation to alleviate heavy periods    FRACTURE SURGERY      Repair broken left wrist with titanium plate and 8 screws    WRIST FRACTURE SURGERY Left 2016       Social History     Socioeconomic History    Marital status:    Tobacco Use    Smoking status: Never    Smokeless tobacco: Never   Vaping Use    Vaping status: Never Used   Substance and Sexual Activity    Alcohol use: Yes     Alcohol/week: 1.0 standard drink of alcohol     Types: 1 Drinks containing 0.5 oz of alcohol per week     Comment: a drink with special occasions, maybe once a month at most    Drug use: Never    Sexual activity: Yes     Partners: Male     Birth control/protection: Essure     Comment: With          Current Outpatient Medications:     ipratropium (ATROVENT) 0.03 % nasal spray, 2 sprays into the nostril(s) as directed by provider Daily As Needed., Disp: , Rfl:     loratadine (CLARITIN) 10 MG tablet, Take 1 tablet by mouth Every Other Day., Disp: , Rfl:     Montelukast Sodium (SINGULAIR PO), Take 10 mg by mouth Every Other Day., Disp: , Rfl:     NASAL SALINE NA, into the nostril(s) as directed by provider 2 (Two) Times a Day., Disp: , Rfl:     Omega-3 Fatty Acids (fish oil) 1000 MG capsule capsule, Take 1 capsule by mouth Daily  "With Breakfast., Disp: , Rfl:     spironolactone (ALDACTONE) 50 MG tablet, Take 1 tablet by mouth 2 (Two) Times a Day., Disp: , Rfl: 1    benzonatate (Tessalon Perles) 100 MG capsule, Take 1 or 2 perles tid prn cough, Disp: 30 capsule, Rfl: 0    Objective     Vital Signs  /78   Pulse 76   Temp 97.6 °F (36.4 °C) (Temporal)   Resp 22   Ht 185.4 cm (72.99\")   Wt 80.6 kg (177 lb 9.6 oz)   SpO2 98%   BMI 23.44 kg/m²   Estimated body mass index is 23.44 kg/m² as calculated from the following:    Height as of this encounter: 185.4 cm (72.99\").    Weight as of this encounter: 80.6 kg (177 lb 9.6 oz).    BMI is within normal parameters. No other follow-up for BMI required.      Physical Exam  Constitutional:       General: She is not in acute distress.     Appearance: Normal appearance. She is not ill-appearing.   HENT:      Head: Normocephalic and atraumatic.      Nose: Nose normal.   Eyes:      Extraocular Movements: Extraocular movements intact.      Conjunctiva/sclera: Conjunctivae normal.      Pupils: Pupils are equal, round, and reactive to light.   Cardiovascular:      Rate and Rhythm: Normal rate.   Pulmonary:      Effort: Pulmonary effort is normal. No respiratory distress.   Musculoskeletal:         General: Normal range of motion.      Cervical back: Neck supple.      Lumbar back: Positive right straight leg raise test and positive left straight leg raise test.      Right hip: Decreased range of motion.   Skin:     General: Skin is warm and dry.   Neurological:      General: No focal deficit present.      Mental Status: She is alert and oriented to person, place, and time. Mental status is at baseline.   Psychiatric:         Mood and Affect: Mood normal.         Behavior: Behavior normal.         Thought Content: Thought content normal.         Judgment: Judgment normal.          Assessment and Plan     Diagnoses and all orders for this visit:    1. Chronic right-sided low back pain without sciatica " (Primary)  -     Ambulatory Referral to Neurosurgery    2. Decreased mobility of joint of right side of pelvis    Plan:  Referral placed to neurosurgery for further evaluation of symptoms.  Will hold on outpatient imaging at this time.  Will likely need weightbearing films.  May continue to use ibuprofen  May continue to use ice and heat as needed.  Patient declined steroid pack today.  Follow-up with Dr. Hall.    Follow Up  Return if symptoms worsen or fail to improve, for Follow up with Dr. Hall.    RONNI Greer    Part of this note may be an electronic transcription/translation of spoken language to printed text using the Dragon Dictation System.  Answers submitted by the patient for this visit:  Other (Submitted on 5/11/2024)  Please describe your symptoms.: Chronic pain in right hip that may be bursitis  Have you had these symptoms before?: Yes  How long have you been having these symptoms?: Greater than 2 weeks  Please list any medications you are currently taking for this condition.: Ibuprofen and Aleve  Please describe any probable cause for these symptoms. : High volume of repetitive motion in the area (I walk half marathons) and pressure to the hip joint from sitting many hours a day for my job  Primary Reason for Visit (Submitted on 5/11/2024)  What is the primary reason for your visit?: Other

## 2024-05-28 ENCOUNTER — OFFICE VISIT (OUTPATIENT)
Dept: NEUROSURGERY | Facility: CLINIC | Age: 54
End: 2024-05-28
Payer: COMMERCIAL

## 2024-05-28 ENCOUNTER — HOSPITAL ENCOUNTER (OUTPATIENT)
Dept: GENERAL RADIOLOGY | Facility: HOSPITAL | Age: 54
Discharge: HOME | End: 2024-05-28
Payer: COMMERCIAL

## 2024-05-28 VITALS — BODY MASS INDEX: 23.84 KG/M2 | HEIGHT: 72 IN | TEMPERATURE: 97.5 F | WEIGHT: 176 LBS

## 2024-05-28 DIAGNOSIS — M54.50 RIGHT-SIDED LOW BACK PAIN WITHOUT SCIATICA, UNSPECIFIED CHRONICITY: Primary | ICD-10-CM

## 2024-05-28 DIAGNOSIS — M25.551 RIGHT HIP PAIN: ICD-10-CM

## 2024-05-28 DIAGNOSIS — M54.50 RIGHT-SIDED LOW BACK PAIN WITHOUT SCIATICA, UNSPECIFIED CHRONICITY: ICD-10-CM

## 2024-05-28 DIAGNOSIS — M81.0 AGE-RELATED OSTEOPOROSIS WITHOUT CURRENT PATHOLOGICAL FRACTURE: ICD-10-CM

## 2024-05-28 PROCEDURE — 73502 X-RAY EXAM HIP UNI 2-3 VIEWS: CPT

## 2024-05-28 PROCEDURE — 99214 OFFICE O/P EST MOD 30 MIN: CPT

## 2024-05-28 PROCEDURE — 72100 X-RAY EXAM L-S SPINE 2/3 VWS: CPT

## 2024-05-28 NOTE — PROGRESS NOTES
Name: Emiliana Garcia    : 1970     MRN: [id number]     Primary Care Provider: Saloni Hall MD    Chief Complaint  Back Pain (Right hip )      History of Present Illness:  Emiliana Garcia is a 53 y.o. female who presents to the clinic today for evaluation of right hip/low back pain.  She states her pain has been present over the last 3 months but seems to be worsening.  She states she walks marathon, one recently in April.  She initially thought this pain was coming from the training but now is concerned as it is staying.  She describes pain in her right low back into her right buttock as well as pain in her groin.  She denies any pain radiating down her legs, numbness, tingling, weakness of her lower extremities, B/B issues.  She does have osteoporosis on bone density scan in 2023.  She is not on any current treatment for osteoporosis.    PMHX  Allergies:  Allergies   Allergen Reactions    Bactrim [Sulfamethoxazole-Trimethoprim] Itching     All over itching, chills, jittery    Erythromycin Other (See Comments)     Stomach pains, rash    Hydrocodone Other (See Comments)    Lorabid [Loracarbef] Other (See Comments)     Stomach pains, rash      Penicillins Other (See Comments)     Allergy unknown, happened as a child     Medications    Current Outpatient Medications:     ipratropium (ATROVENT) 0.03 % nasal spray, 2 sprays into the nostril(s) as directed by provider Daily As Needed., Disp: , Rfl:     loratadine (CLARITIN) 10 MG tablet, Take 1 tablet by mouth Every Other Day., Disp: , Rfl:     NASAL SALINE NA, into the nostril(s) as directed by provider 2 (Two) Times a Day., Disp: , Rfl:     Omega-3 Fatty Acids (fish oil) 1000 MG capsule capsule, Take 1 capsule by mouth Daily With Breakfast., Disp: , Rfl:     Singulair 10 MG tablet, Take 1 tablet by mouth Daily., Disp: , Rfl:     spironolactone (ALDACTONE) 50 MG tablet, Take 1 tablet by mouth 2 (Two) Times a Day., Disp: , Rfl: 1  Past Medical  History:  Past Medical History:   Diagnosis Date    Abnormal mammogram of left breast 09/10/2021    Allergic     Closed fracture of distal end of radius 09/12/2016    Closed fracture of distal end of tibia 04/25/2016    Closed fracture of styloid process of ulna 09/12/2016    Deep vein thrombosis     Elbow joint pain 09/12/2016    Fractures     Heart murmur     MVP    History of medical problems     Chronic pain right hip    Low back pain Feb 24    May-Thurner syndrome     Migraine     Mitral valve prolapse     Osteopenia     Pain in joint involving ankle and foot 04/06/2016    From Automated Load;Provider: Carlton Muñoz;Status: Active    Pain in left foot 04/06/2016    Sprain of ankle 10/07/2016    Stress fracture of tibia 05/20/2016    Thrombocytopenia 09/09/2019    Vasomotor rhinitis     Visual impairment     History of dry eyes, cornea scratches, staph marginal ulcers     Past Surgical History:  Past Surgical History:   Procedure Laterality Date    AUGMENTATION MAMMAPLASTY      2012    BREAST BIOPSY Left 08/05/2021    COLONOSCOPY  1-    One benign hyperplastic polyp    ENDOMETRIAL ABLATION      Uterine ablation to alleviate heavy periods    EPIDURAL BLOCK  April 2004    During labor    FRACTURE SURGERY      Repair broken left wrist with titanium plate and 8 screws    WRIST FRACTURE SURGERY Left 2016     Social Hx:  Social History     Tobacco Use    Smoking status: Never     Passive exposure: Never    Smokeless tobacco: Never   Vaping Use    Vaping status: Never Used   Substance Use Topics    Alcohol use: Yes     Alcohol/week: 1.0 standard drink of alcohol     Types: 1 Drinks containing 0.5 oz of alcohol per week     Comment: a drink with special occasions, maybe once a month at most    Drug use: Never     Family Hx:  Family History   Problem Relation Age of Onset    Lupus Mother         skin involvement    Stroke Mother         October 2022    Alzheimer's disease Mother     Mental illness Mother          depression    Other Mother         Alzheimer's diagnosis May 2023    Hypertension Father     Alzheimer's disease Father     Prostate cancer Father     Skin cancer Father         melanoma    Other Father         Alzheimer's - DOD March 2012    Lupus Sister     Thyroid disease Brother     Osteoporosis Maternal Grandmother     Alzheimer's disease Maternal Grandmother     Other Maternal Grandmother         Alzheimer's - DOD December 2005    Stroke Maternal Grandfather     Heart attack Paternal Grandmother     Hypertension Paternal Grandmother     Breast cancer Neg Hx     Ovarian cancer Neg Hx      Review of Systems:        Review of Systems   Constitutional:  Negative for activity change, appetite change, chills, diaphoresis, fatigue, fever and unexpected weight change.   HENT:  Negative for congestion, dental problem, drooling, ear discharge, ear pain, facial swelling, hearing loss, mouth sores, nosebleeds, postnasal drip, rhinorrhea, sinus pressure, sinus pain, sneezing, sore throat, tinnitus, trouble swallowing and voice change.    Eyes:  Negative for photophobia, pain, discharge, redness, itching and visual disturbance.   Respiratory:  Negative for apnea, cough, choking, chest tightness, shortness of breath, wheezing and stridor.    Cardiovascular:  Negative for chest pain, palpitations and leg swelling.   Gastrointestinal:  Negative for abdominal distention, abdominal pain, anal bleeding, blood in stool, constipation, diarrhea, nausea, rectal pain and vomiting.   Endocrine: Negative for cold intolerance, heat intolerance, polydipsia, polyphagia and polyuria.   Genitourinary:  Negative for decreased urine volume, difficulty urinating, dysuria, enuresis, flank pain, frequency, genital sores, hematuria and urgency.   Musculoskeletal:  Positive for arthralgias, back pain, joint swelling and myalgias. Negative for gait problem, neck pain and neck stiffness.   Skin:  Negative for color change, pallor, rash and wound.  "  Allergic/Immunologic: Negative for environmental allergies, food allergies and immunocompromised state.   Neurological:  Positive for numbness. Negative for dizziness, tremors, seizures, syncope, facial asymmetry, speech difficulty, weakness, light-headedness and headaches.   Hematological:  Negative for adenopathy. Does not bruise/bleed easily.   Psychiatric/Behavioral:  Negative for agitation, behavioral problems, confusion, decreased concentration, dysphoric mood, hallucinations, self-injury, sleep disturbance and suicidal ideas. The patient is not nervous/anxious and is not hyperactive.    All other systems reviewed and are negative.         Vital Signs: Temp 97.5 °F (36.4 °C) (Infrared)   Ht 185.4 cm (72.99\")   Wt 79.8 kg (176 lb)   BMI 23.23 kg/m²      Physical Exam  Awake, alert and oriented x 3  Speech f/c  Opens eyes spontaneously  Pupils 3 mm rx bilaterally  Extraocular muscles intact bilaterally  Face symmetric bilaterally  Tongue midline  5/5 in bilateral lower extremities  Normal sensation to light touch in lower extremities  SI joints nontender  Hip ROM normal      Social History    Tobacco Use      Smoking status: Never        Passive exposure: Never      Smokeless tobacco: Never       Tobacco Use: Low Risk  (5/28/2024)    Patient History     Smoking Tobacco Use: Never     Smokeless Tobacco Use: Never     Passive Exposure: Never           STEADI Fall Risk Assessment has not been completed.      Diagnostic Studies: (All imaging is independently reviewed unless stated otherwise.)        Assessment/Plan    Diagnoses and all orders for this visit:    1. Right-sided low back pain without sciatica, unspecified chronicity (Primary)  -     XR hip w or wo pelvis 2-3 view right; Future  -     XR spine lumbar ap and lateral; Future  -     Ambulatory Referral to Orthopedic Surgery    2. Right hip pain  -     Ambulatory Referral to Physical Therapy  -     XR spine lumbar ap and lateral; Future  -     " Ambulatory Referral to Orthopedic Surgery    3. Age-related osteoporosis without current pathological fracture  -     XR hip w or wo pelvis 2-3 view right; Future  -     Ambulatory Referral to Physical Therapy  -     XR spine lumbar ap and lateral; Future  -     Ambulatory Referral to Orthopedic Surgery         This is a 53-year-old female who presents for evaluation of right low back/right hip pain that is constant nature and has been going on over the last 3 months.  On exam, she is neurologically intact.  She has no radicular pain or signs of myelopathy.  I would like to obtain x-rays of her right hip as well as lumbar spine for further evaluation of any fractures as she does have a history of osteoporosis.  I have also given her referral to physical therapy for her right hip.  In addition, a referral to orthopedic surgery for evaluation of her right hip.  Will have the patient follow up in 4 to 6 weeks after physical therapy to see how she is doing.  If at that point she, she continues to have pain, would likely move forward with MRI lumbar spine. Patient encouraged to contact us if she has any changes in her condition or any concerns.    Any copied data from previous notes included in the (1) HPI, (2) PE, (3) MDM and/or Assessment and Plan has been reviewed and accurate as of 05/28/24.    Brianna Jones PA-C  05/28/24

## 2024-06-03 ENCOUNTER — OFFICE VISIT (OUTPATIENT)
Dept: ORTHOPEDIC SURGERY | Facility: CLINIC | Age: 54
End: 2024-06-03
Payer: COMMERCIAL

## 2024-06-03 VITALS
WEIGHT: 170.6 LBS | HEIGHT: 72 IN | DIASTOLIC BLOOD PRESSURE: 76 MMHG | SYSTOLIC BLOOD PRESSURE: 112 MMHG | BODY MASS INDEX: 23.11 KG/M2

## 2024-06-03 DIAGNOSIS — M53.3 SACRAL BACK PAIN: ICD-10-CM

## 2024-06-03 DIAGNOSIS — M81.0 AGE-RELATED OSTEOPOROSIS WITHOUT CURRENT PATHOLOGICAL FRACTURE: ICD-10-CM

## 2024-06-03 DIAGNOSIS — M25.551 RIGHT HIP PAIN: Primary | ICD-10-CM

## 2024-06-03 PROCEDURE — 99203 OFFICE O/P NEW LOW 30 MIN: CPT | Performed by: PHYSICIAN ASSISTANT

## 2024-06-03 NOTE — PROGRESS NOTES
[facility] – [place] [facility] Note    Subjective     Chief Complaint   Patient presents with    Right Hip - Pain        HPI  Emiliana Garcia is a 53 y.o. female.  New patient presents for evaluation of right hip/right buttock/low back pain.  Symptoms/pain have been ongoing since February 2024.  IKNGS: No specific history of injury or trauma however patient walks half marathons with her last marathon being at the end of April.  During the training and there after she has had pain to the hip/buttock area, worsening.  States pain never travels radiates to the knee.  States she has to have assistance when trying to stand up from the toilet or has to push herself up when she is trying to get out of a chair.    Pain scale: 7-8/10.  Severity of the pain moderate to severe at times.  Quality of the pain aching, shooting, sharp--these were constant.  Associated symptoms pain, stiffness.  Activity related to pain prolonged sitting, sleeping, lying on the affected side, rising from a seated position, movement of joint.  Pain eased by medication, lying flat.  Prior treatments anti-inflammatories.  Patient has PT ordered but has not started as of yet.    Does have a history of May Thurner syndrome.  Also notes history of osteopenia/osteoporosis.    Denies fever, chills, night sweats or other constitutional symptoms.    Past Medical History:   Diagnosis Date    Abnormal mammogram of left breast 09/10/2021    Allergic     Closed fracture of distal end of radius 09/12/2016    Closed fracture of distal end of tibia 04/25/2016    Closed fracture of styloid process of ulna 09/12/2016    Deep vein thrombosis     Elbow joint pain 09/12/2016    Fracture of wrist     Fractures     Heart murmur     MVP    History of medical problems     Chronic pain right hip    Low back pain Feb 24    May-Thurner syndrome     Migraine     Mitral valve prolapse     Osteopenia     Pain in joint involving ankle and foot 04/06/2016    From Automated  Load;Provider: Carlton Muñoz;Status: Active    Pain in left foot 04/06/2016    Sprain of ankle 10/07/2016    Stress fracture of tibia 05/20/2016    Thrombocytopenia 09/09/2019    Vasomotor rhinitis     Visual impairment     History of dry eyes, cornea scratches, staph marginal ulcers      Past Surgical History:   Procedure Laterality Date    AUGMENTATION MAMMAPLASTY      2012    BREAST BIOPSY Left 08/05/2021    COLONOSCOPY  1-    One benign hyperplastic polyp    ENDOMETRIAL ABLATION      Uterine ablation to alleviate heavy periods    EPIDURAL BLOCK  April 2004    During labor    FRACTURE SURGERY      Repair broken left wrist with titanium plate and 8 screws    WRIST FRACTURE SURGERY Left 2016      Family History   Problem Relation Age of Onset    Lupus Mother         skin involvement    Stroke Mother         October 2022    Alzheimer's disease Mother     Mental illness Mother         depression    Other Mother         Alzheimer's diagnosis May 2023    Anesthesia problems Mother     Hypertension Father     Alzheimer's disease Father     Prostate cancer Father     Skin cancer Father         melanoma    Other Father         Alzheimer's - DOD March 2012    Cancer Father     Lupus Sister     Thyroid disease Brother     Osteoporosis Maternal Grandmother     Alzheimer's disease Maternal Grandmother     Other Maternal Grandmother         Alzheimer's - DOD December 2005    Stroke Maternal Grandfather     Heart attack Paternal Grandmother     Hypertension Paternal Grandmother     Breast cancer Neg Hx     Ovarian cancer Neg Hx      Social History     Socioeconomic History    Marital status:    Tobacco Use    Smoking status: Never     Passive exposure: Past    Smokeless tobacco: Never   Vaping Use    Vaping status: Never Used   Substance and Sexual Activity    Alcohol use: Yes     Alcohol/week: 1.0 standard drink of alcohol     Types: 1 Drinks containing 0.5 oz of alcohol per week     Comment: a drink with special  occasions, maybe once a month at most    Drug use: Never    Sexual activity: Yes     Partners: Male     Birth control/protection: Essure     Comment: With       Current Outpatient Medications on File Prior to Visit   Medication Sig Dispense Refill    ipratropium (ATROVENT) 0.03 % nasal spray 2 sprays into the nostril(s) as directed by provider Daily As Needed.      loratadine (CLARITIN) 10 MG tablet Take 1 tablet by mouth Every Other Day.      NASAL SALINE NA into the nostril(s) as directed by provider 2 (Two) Times a Day.      Omega-3 Fatty Acids (fish oil) 1000 MG capsule capsule Take 1 capsule by mouth Daily With Breakfast.      Singulair 10 MG tablet Take 1 tablet by mouth Daily.      spironolactone (ALDACTONE) 50 MG tablet Take 1 tablet by mouth 2 (Two) Times a Day.  1     No current facility-administered medications on file prior to visit.      Allergies   Allergen Reactions    Bactrim [Sulfamethoxazole-Trimethoprim] Itching     All over itching, chills, jittery    Erythromycin Other (See Comments)     Stomach pains, rash    Hydrocodone Other (See Comments)    Lorabid [Loracarbef] Other (See Comments)     Stomach pains, rash      Penicillins Other (See Comments)     Allergy unknown, happened as a child        The following portions of the patient's history were reviewed and updated as appropriate: allergies, current medications, past family history, past medical history, past social history, past surgical history, and problem list.    Review of Systems   Constitutional: Negative.    HENT: Negative.     Eyes: Negative.    Respiratory: Negative.     Cardiovascular: Negative.    Gastrointestinal: Negative.    Endocrine: Negative.    Genitourinary: Negative.    Musculoskeletal:  Positive for arthralgias.   Skin: Negative.    Allergic/Immunologic: Negative.    Neurological: Negative.    Hematological: Negative.    Psychiatric/Behavioral: Negative.          Objective      Physical Exam  /76   Ht 185.5  "cm (73.03\")   Wt 77.4 kg (170 lb 9.6 oz)   BMI 22.49 kg/m²     Body mass index is 22.49 kg/m².    GENERAL APPEARANCE: awake, alert & oriented x 3, in no acute distress and well developed, well nourished  PSYCH: normal mood and affect  LUNGS:  breathing nonlabored, no wheezing  EYES: sclera anicteric, pupils equal  CARDIOVASCULAR: palpable pulses. Capillary refill less than 2 seconds  INTEGUMENTARY: skin intact, no clubbing, cyanosis  NEUROLOGIC:  Normal Sensation        Ortho Exam  Right hip  Tenderness: Anterior hip/flexors positive.  Groin negative.  Posterior hip/SIJ/buttock positive.  Motion: Flx 110°, internal rotation 30°, external rotation 30°.   Testing: Hip scouring mild discomfort, Stinchfield mild discomfort, Hip flx to 90° with IR/ER negative.  SI compression/distraction positive.  Strength: Hip flx/ext/abd 5/5, Q/HS 5/5.  Motor: Grossly intact Q/HS/TA/GS/EHL/P.  Sensory: Grossly intact to LT L2-S1.      Imaging/Studies  Reviewed plain film imaging of her right hip and lumbar spine performed on 5/28/2024.  XR HIP W OR WO PELVIS 2-3 VIEW RIGHT, XR SPINE LUMBAR AP AND LATERAL     Date of Exam: 5/28/2024 9:57 AM EDT     Indication: hip pain     Comparison: No previous comparison study     Findings:  AP PELVIS AND RIGHT HIP, 3 TOTAL VIEWS: Tubal occlusion devices are noted in the pelvis. There is transitional lumbosacral anatomy on the right, and a suggestion of associated right SI joint DJD, with associated focal sclerotic change in the right ilium.   Hip joints appear well-maintained. No significant degenerative changes are appreciated. There is minimal marginal osteophyte formation, typical for age. No bony lytic or blastic changes are noted of the pelvis or hips. In particular, no evidence of   acute or healing right hip trauma or significant DJD is seen.     IMPRESSION:  1. Hips appear within normal limits for age. No evidence of acute or healing trauma.     2. Incidentally noted transitional " lumbosacral anatomy on the right, and suspected associated right SI joint DJD.           LUMBAR SPINE AP AND LATERAL VIEWS: No significant abnormality of lumbar vertebral alignment is seen on the lateral view. Vertebral body heights and disc spaces are generally well-maintained. AP view shows a mild dextroconvex scoliosis. Transitional   lumbosacral anatomy is again seen with fused appearance of the enlarged right L5 transverse process with the upper sacrum, associated SI joint narrowing, and what appears to be a focal 3 cm area of dense sclerosis in the adjacent right ilium. Bilateral   tubal occlusion devices are again noted.     Impression:     1. Very mild dextroconvex lumbar scoliosis. No evidence of significant focal subluxation or advanced degenerative disc disease.     2. Asymmetrically fused appearance of the right lumbosacral junction, apparently a developmental variant, and what appears to be associated SI joint DJD. Probably associated 3 cm area of sclerosis in the right ilium. If patient's symptoms refer to this   region, evaluation with CT or MRI could be considered.        Electronically Signed: Semaj Davidson MD    5/30/2024 11:26 AM EDT    Workstation ID: [id number]    Assessment/Plan        ICD-10-CM ICD-9-CM   1. Right hip pain  M25.551 719.45   2. Sacral back pain  M53.3 724.6   3. Age-related osteoporosis without current pathological fracture  M81.0 733.01       Orders Placed This Encounter   Procedures    MRI Pelvis Without Contrast        -Right hip and SI back pain in setting of age-related osteoporosis.  -Reviewed imaging with the patient.  -With location of the pain there is a concern for possible insufficiency/stress fracture especially with her history of osteoporosis.  -Osteoporosis--PCP to dictate treatment.  -Patient has PT referral pending.  Recommend she proceed with getting this scheduled for now they can proceed with gentle range of motion and stretching.  -Recommend OTC NSAIDS/pain  medication as needed.  -Ordered MRI pelvis to assess for sacral insufficiency/stress fracture based on history, exam, severity of pain and difficulty with weight bearing will help with dictating appropriate treatment course.  -Follow up after MRI completed.  -Questions and concerns answered.      Medical Decision Making  Management Options : over-the-counter medicine  Data/Risk: radiology tests      Agatha Gerard PA-C  06/03/24  21:39 EDT               EMR Dragon/Transcription disclaimer:  Much of this encounter note is an electronic transcription of spoken language to printed text. Electronic transcription of spoken language may permit erroneous, or at times, nonsensical words or phrases to be inadvertently transcribed. Although I have reviewed the note for such errors, some may still exist.

## 2024-06-13 ENCOUNTER — HOSPITAL ENCOUNTER (OUTPATIENT)
Dept: MRI IMAGING | Facility: HOSPITAL | Age: 54
Discharge: HOME | End: 2024-06-13
Admitting: PHYSICIAN ASSISTANT
Payer: COMMERCIAL

## 2024-06-13 DIAGNOSIS — M53.3 SACRAL BACK PAIN: ICD-10-CM

## 2024-06-13 DIAGNOSIS — M81.0 AGE-RELATED OSTEOPOROSIS WITHOUT CURRENT PATHOLOGICAL FRACTURE: ICD-10-CM

## 2024-06-13 DIAGNOSIS — M25.551 RIGHT HIP PAIN: ICD-10-CM

## 2024-06-13 PROCEDURE — 72195 MRI PELVIS W/O DYE: CPT

## 2024-06-24 ENCOUNTER — TREATMENT (OUTPATIENT)
Dept: PHYSICAL THERAPY | Facility: CLINIC | Age: 54
End: 2024-06-24
Payer: COMMERCIAL

## 2024-06-24 DIAGNOSIS — M53.3 SACROILIAC DYSFUNCTION: ICD-10-CM

## 2024-06-24 DIAGNOSIS — M25.551 PAIN OF RIGHT HIP: Primary | ICD-10-CM

## 2024-06-24 NOTE — PROGRESS NOTES
Physical Therapy Initial Evaluation and Plan of Care    [facility]    [address], [address] [place] [address]    Patient: Emiliana Garcia   : 1970  Diagnosis/ICD-10 Code:  Pain of right hip [M25.551]  Referring practitioner: Brianna Jones PA-C  Date of Initial Visit: 2024  Today's Date: 2024  Patient seen for 1 session         Visit Diagnoses:    ICD-10-CM ICD-9-CM   1. Pain of right hip  M25.551 719.45   2. Sacroiliac dysfunction  M53.3 724.6         Subjective Evaluation    History of Present Illness  Mechanism of injury: Pt states that in February she started training for a half-marathon and she had pain after her walking sessions in the right hip. She reports feeling very stiff in the right hip with pain around the hip and in the hip flexor. She tried to manage the pain with ibuprofen and she kept on training and completed the race, but she has continued to have pain in the hip and feels that now she has a constant dull pain with exacerbations of symptoms when she is up and walking.     Pt reports that she feels like she has had some issues for about 5 years, but she had been able to overcome and work through the symptoms up until this year.     The pain seems to be activitiy dependent and she feels that she she is generally more active the pain comes on quicker and is more severe.     She was able to run a half marathon in April, but she had an unusual experience where she has numbness in both arms and legs and nausea. She is unsure if this is related.     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 There is associated abutment of the bilateral descending S2 and S3 nerve roots, with greater displacement on the right. Additional small   bilateral L5-S1 foraminal cyst are present.     Partial sacralization of L5-S1 on the right with marrow edema at the pseudoarthrosis, which could be  a source of pain     No fracture or avascular necrosis. Moderate bilateral hip arthritis.         Patient Occupation: Accounting/software - prolonged sitting Quality of life: good    Pain  Current pain ratin  At best pain ratin  At worst pain rating: 10  Location: right low back/SI; right hip  Quality: dull ache  Relieving factors: rest, change in position and medications  Aggravating factors: ambulation, repetitive movement, lifting and prolonged positioning (sleeping on the left side)  Progression: worsening    Diagnostic Tests  Abnormal x-ray: see imaging section in epic.  Abnormal MRI: see imaging section in epic.    Treatments  No previous or current treatments  Patient Goals  Patient goals for therapy: decreased pain, increased motion, increased strength and return to sport/leisure activities           Objective          Palpation     Additional Palpation Details  Tenderness to palpation noted at the right SI joint.  Hypertonicity and tenderness palpation noted at the right piriformis and glue medius as well.    Active Range of Motion     Lumbar   Flexion: Active lumbar flexion: 80%   Extension: Active lumbar extension: 50%   Left lateral flexion: Active left lumbar lateral flexion: 75%   Right lateral flexion: Active right lumbar lateral flexion: 75%     Additional Active Range of Motion Details  Mild discomfort noted with right lateral flexion    Passive Range of Motion     Right Hip   Flexion: 80 degrees   Abduction: 39 degrees   External rotation (90/90): 50 degrees   Internal rotation (90/90): 30 degrees     Strength/Myotome Testing     Lumbar   Left   Normal strength    Right   Normal strength    Left Hip   Planes of Motion   Extension: 4-  Abduction: 4-    Right Hip   Planes of Motion   Extension: 4-  Abduction: 4-    Additional Strength Details  No myotomal weakness noted with strength testing    Tests       Thoracic   Negative slump.     Lumbar     Right   Negative passive SLR.     Right Hip    Negative GORDON and scour.     Additional Tests Details  Supine leg length test + for anterior rotation right innominate     Ambulation     Ambulation: Level Surfaces     Additional Level Surfaces Ambulation Details  Mild gait deviation with a decreased weight acceptance on the right noted while walking in the clinic          Assessment & Plan       Assessment  Impairments: abnormal muscle tone, abnormal or restricted ROM, activity intolerance, impaired physical strength, lacks appropriate home exercise program and pain with function   Functional limitations: carrying objects, lifting, pulling, pushing, uncomfortable because of pain, sitting and unable to perform repetitive tasks   Assessment details: Patient is a 53 year old female who comes to physical therapy with c/o pain in the right posterior hip. Signs and symptoms are consistent with lumbopelvic instability with possible SI joint dysfunction, in addition to space occupying cysts in the lumbar spine confirmed by MRI resulting in pain, decreased ROM, decreased strength, and inability to perform all essential functional activities. Pt will benefit from skilled PT services to address the above issues.     Prognosis details:   SHORT TERM GOALS:     2 weeks  1. Pt independent with HEP  2. Pt to demonstrate trunk AROM 50-75% of expected norms to allow for improved ability to perform ADL's  3. Pt to demonstrate bilateral hip strength 4/5 in all planes to improved stability of the core/trunk     LONG TERM GOALS:   6 weeks  1. Pt to demonstrate trunk AROM % of expected norms to allow for improved ability to perform functional activities  2. Pt to demonstrate ability to perform full functional squat with good form and without increased pain in the low back   3. Pt to report being able to work full shift or work in the home without increase in pain in the back  4. Pt to report cessation of pain/numbness/tingling into the right leg to show decreased nerve  compression    Plan  Therapy options: will be seen for skilled therapy services  Planned modality interventions: cryotherapy, high voltage pulsed current (pain management), iontophoresis, microcurrent electrical stimulation, TENS, thermotherapy (hydrocollator packs), ultrasound and traction  Planned therapy interventions: ADL retraining, body mechanics training, flexibility, functional ROM exercises, home exercise program, IADL retraining, joint mobilization, manual therapy, motor coordination training, neuromuscular re-education, postural training, soft tissue mobilization, spinal/joint mobilization, strengthening, stretching and abdominal trunk stabilization  Frequency: 2x week  Duration in weeks: 12  Treatment plan discussed with: patient        History # of Personal Factors and/or Comorbidities: LOW (0)  Examination of Body System(s): # of elements: MODERATE (3)  Clinical Presentation: STABLE   Clinical Decision Making: MODERATE      Timed:         Manual Therapy:    10     mins  73221;     Therapeutic Exercise:    14     mins  82141;     Neuromuscular Zeeshan:        mins  59885;    Therapeutic Activity:          mins  58835;     Gait Training:           mins  00885;     Ultrasound:          mins  69097;    Ionto                                   mins   23929  Self Care                            mins   40168  Canalith Repos         mins 27118      Un-Timed:  Electrical Stimulation:         mins  67100 ( );  Dry Needling          mins self-pay  Traction          mins 15051  Low Eval          Mins  89483  Mod Eval     30     Mins  80576  High Eval                            Mins  19295        Timed Treatment:   24   mins   Total Treatment:     54   mins          PT: Kimo Bloom PT, DPT, OCS, Cert. DN   License Number: [id number]  Electronically signed by Kimo Bloom PT, 06/24/24, 2:48 PM EDT    Certification Period: 6/24/2024 thru 9/21/2024  I certify that the therapy services are furnished while this  patient is under my care.  The services outlined above are required by this patient, and will be reviewed every 90 days.         Physician Signature:__________________________________________________    PHYSICIAN: Brianna Jones PA-C  NPI: [id number]                                      DATE:      Please sign and return via fax to [email or web address] . Thank you, [facility].

## 2024-06-25 ENCOUNTER — TELEPHONE (OUTPATIENT)
Dept: ORTHOPEDIC SURGERY | Facility: CLINIC | Age: 54
End: 2024-06-25
Payer: COMMERCIAL

## 2024-07-01 ENCOUNTER — TREATMENT (OUTPATIENT)
Dept: PHYSICAL THERAPY | Facility: CLINIC | Age: 54
End: 2024-07-01
Payer: COMMERCIAL

## 2024-07-01 DIAGNOSIS — M25.551 PAIN OF RIGHT HIP: Primary | ICD-10-CM

## 2024-07-01 DIAGNOSIS — M53.3 SACROILIAC DYSFUNCTION: ICD-10-CM

## 2024-07-01 NOTE — PROGRESS NOTES
Physical Therapy Daily Treatment Note    [facility]   [address], [address] [place] [address]      Patient: Emiliana Garcia   : 1970  Referring practitioner: Brianna Jones PA-C  Date of Initial Visit: Type: THERAPY  Noted: 2024  Today's Date: 2024  Patient seen for 2 sessions    Certification Period 2024 thru 2024       Visit Diagnoses:    ICD-10-CM ICD-9-CM   1. Pain of right hip  M25.551 719.45   2. Sacroiliac dysfunction  M53.3 724.6       Subjective     Pt states that she feels like she has had some improvement since the initial visit. After the SI correction and stretching she feels like the joint type of pain on the right side is decreased, and she is dealing more with muscle tightness around the area.     Objective   See Exercise, Manual, and Modality Logs for complete treatment.       Assessment/Plan     Pt has responded well to treatment so far and she was able to tolerate more exercise in the clinic today. Focused on maintaining core and pelvic stability with resisted activity . Emiliana Garcia will continue to benefit from skilled physical therapy services to address deficits and continue to work towards reaching functional goals.           Timed:         Manual Therapy:    10     mins  64353;     Therapeutic Exercise:    14     mins  13468;     Neuromuscular Zeeshan:    32    mins  62448;    Therapeutic Activity:          mins  33023;     Gait Training:           mins  85112;     Ultrasound:          mins  99290;    Ionto                                   mins   61268  Self Care                            mins   11277  Canalith Repos         mins 03642  Electrical Stimulation:         mins  75639    Un-Timed:  Electrical Stimulation:         mins  86487 ( );  Dry Needling          mins self-pay  Traction          mins 45329      Timed Treatment:   56   mins   Total Treatment:     56   mins    Kimo Bloom, PT, DPT, Cert. DN  [place] License: [id number]

## 2024-07-08 ENCOUNTER — TREATMENT (OUTPATIENT)
Dept: PHYSICAL THERAPY | Facility: CLINIC | Age: 54
End: 2024-07-08
Payer: COMMERCIAL

## 2024-07-08 DIAGNOSIS — M53.3 SACROILIAC DYSFUNCTION: ICD-10-CM

## 2024-07-08 DIAGNOSIS — M25.551 PAIN OF RIGHT HIP: Primary | ICD-10-CM

## 2024-07-08 NOTE — PROGRESS NOTES
Physical Therapy Daily Treatment Note    [facility]   [address], [address] [place] [address]      Patient: Emiliana Garcia   : 1970  Referring practitioner: Brianna Jones PA-C  Date of Initial Visit: Type: THERAPY  Noted: 2024  Today's Date: 2024  Patient seen for 3 sessions    Certification Period 2024 thru 10/5/2024       Visit Diagnoses:    ICD-10-CM ICD-9-CM   1. Pain of right hip  M25.551 719.45   2. Sacroiliac dysfunction  M53.3 724.6       Subjective     Pt states that she is feeling improved still since beginning therapy and she feels that the SIJ specific pain has not returned since the initial visit. She feels some general soreness about the hips. Pt has not attempted any long walks yet.     Objective   See Exercise, Manual, and Modality Logs for complete treatment.       Assessment/Plan     Pt has progressed well so far and she demonstrates an improvement in her tolerance to resisted activity in the clinic. Will cont to progress for more functinoal and dynamic tasks in the next couple of sessions. Emiliana Garcia will continue to benefit from skilled physical therapy services to address deficits and continue to work towards reaching functional goals.           Timed:         Manual Therapy:    10     mins  86719;     Therapeutic Exercise:    14     mins  40868;     Neuromuscular Zeeshan:    30    mins  73773;    Therapeutic Activity:          mins  73774;     Gait Training:           mins  58769;     Ultrasound:          mins  31063;    Ionto                                   mins   23098  Self Care                            mins   14202  Canalith Repos         mins 41389  Electrical Stimulation:         mins  13550    Un-Timed:  Electrical Stimulation:         mins  95911 ( );  Dry Needling          mins self-pay  Traction          mins 61409      Timed Treatment:   54   mins   Total Treatment:     54   mins    Kimo Bloom, PT, DPT, Cert. DN  [place] License:  [id number]

## 2024-07-15 ENCOUNTER — TREATMENT (OUTPATIENT)
Dept: PHYSICAL THERAPY | Facility: CLINIC | Age: 54
End: 2024-07-15
Payer: COMMERCIAL

## 2024-07-15 DIAGNOSIS — M53.3 SACROILIAC DYSFUNCTION: ICD-10-CM

## 2024-07-15 DIAGNOSIS — M25.551 PAIN OF RIGHT HIP: Primary | ICD-10-CM

## 2024-07-17 NOTE — PROGRESS NOTES
Physical Therapy Daily Treatment Note    [facility]   [address], [address] [place] [address]      Patient: Emiliana Garcia   : 1970  Referring practitioner: Brianna Jones PA-C  Date of Initial Visit: Type: THERAPY  Noted: 2024  Today's Date: 2024  Patient seen for 4 sessions    Certification Period 2024 thru 10/14/2024       Visit Diagnoses:    ICD-10-CM ICD-9-CM   1. Pain of right hip  M25.551 719.45   2. Sacroiliac dysfunction  M53.3 724.6       Subjective     Pt states that she feels like she is improving and she has less pain at rest and less pain when walking throughout the day. She has not taken a longer walk yet.     Objective   See Exercise, Manual, and Modality Logs for complete treatment.       Assessment/Plan     Increased intensity of activity in the clinic and initiated BOSU activities to challenge balance and stability. Pt was instructed to attempt a longer walk before her next visit to see how her symptoms have changed.     Emiliana Garcia will continue to benefit from skilled physical therapy services to address deficits and continue to work towards reaching functional goals.           Timed:         Manual Therapy:    10     mins  15234;     Therapeutic Exercise:         mins  07413;     Neuromuscular Zeeshan:    14    mins  14670;    Therapeutic Activity:     30     mins  40778;     Gait Training:           mins  92218;     Ultrasound:          mins  07367;    Ionto                                   mins   22355  Self Care                            mins   74787  Canalith Repos         mins 55677  Electrical Stimulation:         mins  92855    Un-Timed:  Electrical Stimulation:         mins  62112 ( );  Dry Needling          mins self-pay  Traction          mins 49624      Timed Treatment:   54   mins   Total Treatment:     54   mins    Kimo Bloom, PT, DPT, Cert. DN  [place] License: [id number]

## 2024-07-18 ENCOUNTER — TELEPHONE (OUTPATIENT)
Dept: NEUROSURGERY | Facility: CLINIC | Age: 54
End: 2024-07-18

## 2024-07-18 NOTE — TELEPHONE ENCOUNTER
CALLER:  MARSHAL BURGOS    RELATION:  SELF    CONTACT NUMBER:  [phone]    CALL REGARDING:  PATIENT CALLED AND STATES THAT SHE HAD AN MRI PELVIS COMPLETED 06/13/2024 @  AND IT STATED FINDINGS OF TARLOV CYST.  PATIENT IS SCHEDULED WITH ROSA LOWE FOR 07/29/2024.  PATIENT STATES SHE WAS UNDER THE IMPRESSION THAT MADAN CALVO WAS SENDING A MESSAGE TO [facility] FOR THE PATIENT TO BE SEEN SOONER BECAUSE THEY SAID IT WAS URGENT.  I HAD NO AVAILABILITY FOR A SOONER APPT.  PATIENT STATES IF THIS APPT CAN'T BE MOVED UP THIS APPT WILL STILL NEED TO BE CHANGED BECAUSE SHE HAS PHYSICAL THERAPY THIS SAME DAY AND TIME.  PATIENT SAID MAYBE SHE SHOULD CHANGE PHYSICAL THERAPY.  SHE WOULD LIKE SOME DIRECTION SHE WAS UNDER THE IMPRESSION THIS WAS BEING TAKEN CARE OF URGENTLY.     MRI Pelvis Without Contrast (06/13/2024 13:15)     PLEASE CALL PATIENT   THANK YOU

## 2024-07-22 ENCOUNTER — TREATMENT (OUTPATIENT)
Dept: PHYSICAL THERAPY | Facility: CLINIC | Age: 54
End: 2024-07-22
Payer: COMMERCIAL

## 2024-07-22 DIAGNOSIS — M53.3 SACROILIAC DYSFUNCTION: ICD-10-CM

## 2024-07-22 DIAGNOSIS — M25.551 PAIN OF RIGHT HIP: Primary | ICD-10-CM

## 2024-07-25 NOTE — PROGRESS NOTES
Physical Therapy Daily Treatment Note    [facility]   [address], [address] [place] [address]      Patient: Emiliana Garcia   : 1970  Referring practitioner: Brianna Jones PA-C  Date of Initial Visit: Type: THERAPY  Noted: 2024  Today's Date: 2024  Patient seen for 5 sessions    Certification Period 2024 thru 10/22/2024       Visit Diagnoses:    ICD-10-CM ICD-9-CM   1. Pain of right hip  M25.551 719.45   2. Sacroiliac dysfunction  M53.3 724.6       Subjective     Patient states that she was able to walk couple miles over the weekend and she had some slight soreness in the right hip but did not experience any significant exacerbation of symptoms, pain, or numbness in the lower extremities.    Objective   See Exercise, Manual, and Modality Logs for complete treatment.       Assessment/Plan     Patient is making progress with therapy and she demonstrates improvement in her ability to form resisted activities and functional tasks in the clinic without an exacerbation of symptoms.  Will continue to progress activity as indicated and will encourage patient to continue to progress her walking time/distance.    Emiliana Garcia will continue to benefit from skilled physical therapy services to address deficits and continue to work towards reaching functional goals.           Timed:         Manual Therapy:    10     mins  16522;     Therapeutic Exercise:    14     mins  24176;     Neuromuscular Zeeshan:    30    mins  19402;    Therapeutic Activity:          mins  27611;     Gait Training:           mins  88833;     Ultrasound:          mins  75369;    Ionto                                   mins   07146  Self Care                            mins   31556  Canalith Repos         mins 64226  Electrical Stimulation:         mins  82841    Un-Timed:  Electrical Stimulation:         mins  92633 ( );  Dry Needling          mins self-pay  Traction          mins 70423      Timed Treatment:   54    mins   Total Treatment:     54   mins    Kimo Bloom, PT, DPT, Cert. DN  [place] License: [id number]

## 2024-07-29 ENCOUNTER — OFFICE VISIT (OUTPATIENT)
Dept: NEUROSURGERY | Facility: CLINIC | Age: 54
End: 2024-07-29
Payer: COMMERCIAL

## 2024-07-29 VITALS — HEIGHT: 72 IN | WEIGHT: 178.3 LBS | TEMPERATURE: 97.1 F | BODY MASS INDEX: 24.15 KG/M2

## 2024-07-29 DIAGNOSIS — M54.50 RIGHT-SIDED LOW BACK PAIN WITHOUT SCIATICA, UNSPECIFIED CHRONICITY: Primary | ICD-10-CM

## 2024-07-29 NOTE — PROGRESS NOTES
Name: Emiliana Garcia    : 1970     MRN: [id number]     Primary Care Provider: Saloni Hall MD    Chief Complaint  Back Pain and Leg Pain (RLE)      History of Present Illness:  Emiliana Garcia is a 53 y.o. female who presents to the clinic today for follow-up regarding right hip/low back pain.  Patient's pain has been present since about 2024.  She initially thought this pain was coming from training for a marathon however it has continued.  She describes pain in her right low back into her right buttock.  Denies any pain radiating further down her legs, numbness, tingling, weakness of her lower extremities, bowel or bladder issues.  She did try physical therapy which she states did help her pain.  She comes in today to review MRI findings.    PMHX  Allergies:  Allergies   Allergen Reactions    Bactrim [Sulfamethoxazole-Trimethoprim] Itching     All over itching, chills, jittery    Erythromycin Other (See Comments)     Stomach pains, rash    Hydrocodone Other (See Comments)    Lorabid [Loracarbef] Other (See Comments)     Stomach pains, rash      Penicillins Other (See Comments)     Allergy unknown, happened as a child     Medications    Current Outpatient Medications:     ipratropium (ATROVENT) 0.03 % nasal spray, 2 sprays into the nostril(s) as directed by provider Daily As Needed., Disp: , Rfl:     loratadine (CLARITIN) 10 MG tablet, Take 1 tablet by mouth Every Other Day., Disp: , Rfl:     NASAL SALINE NA, into the nostril(s) as directed by provider 2 (Two) Times a Day., Disp: , Rfl:     Omega-3 Fatty Acids (fish oil) 1000 MG capsule capsule, Take 1 capsule by mouth Daily With Breakfast., Disp: , Rfl:     Singulair 10 MG tablet, Take 1 tablet by mouth Daily., Disp: , Rfl:     spironolactone (ALDACTONE) 50 MG tablet, Take 1 tablet by mouth 2 (Two) Times a Day., Disp: , Rfl: 1  Past Medical History:  Past Medical History:   Diagnosis Date    Abnormal mammogram of left breast 09/10/2021     Allergic     Closed fracture of distal end of radius 09/12/2016    Closed fracture of distal end of tibia 04/25/2016    Closed fracture of styloid process of ulna 09/12/2016    Deep vein thrombosis     Elbow joint pain 09/12/2016    Fracture of wrist     Fractures     Heart murmur     MVP    History of medical problems     Chronic pain right hip    Low back pain Feb 24    May-Thurner syndrome     Migraine     Mitral valve prolapse     Osteopenia     Pain in joint involving ankle and foot 04/06/2016    From Automated Load;Provider: Carlton Muñoz;Status: Active    Pain in left foot 04/06/2016    Sprain of ankle 10/07/2016    Stress fracture of tibia 05/20/2016    Thrombocytopenia 09/09/2019    Vasomotor rhinitis     Visual impairment     History of dry eyes, cornea scratches, staph marginal ulcers     Past Surgical History:  Past Surgical History:   Procedure Laterality Date    AUGMENTATION MAMMAPLASTY      2012    BREAST BIOPSY Left 08/05/2021    COLONOSCOPY  1-    One benign hyperplastic polyp    ENDOMETRIAL ABLATION      Uterine ablation to alleviate heavy periods    EPIDURAL BLOCK  April 2004    During labor    FRACTURE SURGERY      Repair broken left wrist with titanium plate and 8 screws    WRIST FRACTURE SURGERY Left 2016     Social Hx:  Social History     Tobacco Use    Smoking status: Never     Passive exposure: Past    Smokeless tobacco: Never   Vaping Use    Vaping status: Never Used   Substance Use Topics    Alcohol use: Yes     Alcohol/week: 1.0 standard drink of alcohol     Types: 1 Drinks containing 0.5 oz of alcohol per week     Comment: a drink with special occasions, maybe once a month at most    Drug use: Never     Family Hx:  Family History   Problem Relation Age of Onset    Lupus Mother         skin involvement    Stroke Mother         October 2022    Alzheimer's disease Mother     Mental illness Mother         depression    Other Mother         Alzheimer's diagnosis May 2023    Anesthesia  problems Mother     Hypertension Father     Alzheimer's disease Father     Prostate cancer Father     Skin cancer Father         melanoma    Other Father         Alzheimer's - DOD March 2012    Cancer Father     Lupus Sister     Thyroid disease Brother     Osteoporosis Maternal Grandmother     Alzheimer's disease Maternal Grandmother     Other Maternal Grandmother         Alzheimer's - DOD December 2005    Stroke Maternal Grandfather     Heart attack Paternal Grandmother     Hypertension Paternal Grandmother     Breast cancer Neg Hx     Ovarian cancer Neg Hx      Review of Systems:        Review of Systems   Constitutional:  Negative for activity change, appetite change, chills, diaphoresis, fatigue, fever and unexpected weight change.   HENT:  Negative for congestion, dental problem, drooling, ear discharge, ear pain, facial swelling, hearing loss, mouth sores, nosebleeds, postnasal drip, rhinorrhea, sinus pressure, sinus pain, sneezing, sore throat, tinnitus, trouble swallowing and voice change.    Eyes:  Negative for photophobia, pain, discharge, redness, itching and visual disturbance.   Respiratory:  Negative for apnea, cough, choking, chest tightness, shortness of breath, wheezing and stridor.    Cardiovascular:  Negative for chest pain, palpitations and leg swelling.   Gastrointestinal:  Negative for abdominal distention, abdominal pain, anal bleeding, blood in stool, constipation, diarrhea, nausea, rectal pain and vomiting.   Endocrine: Negative for cold intolerance, heat intolerance, polydipsia, polyphagia and polyuria.   Genitourinary:  Negative for decreased urine volume, difficulty urinating, dyspareunia, dysuria, enuresis, flank pain, frequency, genital sores, hematuria, menstrual problem, pelvic pain, urgency, vaginal bleeding, vaginal discharge and vaginal pain.   Musculoskeletal:  Positive for back pain. Negative for arthralgias, gait problem, joint swelling, myalgias, neck pain and neck stiffness.  "  Skin:  Negative for color change, pallor, rash and wound.   Allergic/Immunologic: Negative for environmental allergies, food allergies and immunocompromised state.   Neurological:  Negative for dizziness, tremors, seizures, syncope, facial asymmetry, speech difficulty, weakness, light-headedness, numbness and headaches.   Hematological:  Negative for adenopathy. Does not bruise/bleed easily.   Psychiatric/Behavioral:  Negative for agitation, behavioral problems, confusion, decreased concentration, dysphoric mood, hallucinations, self-injury, sleep disturbance and suicidal ideas. The patient is not nervous/anxious and is not hyperactive.           Vital Signs: Temp 97.1 °F (36.2 °C) (Infrared)   Ht 185.5 cm (73.03\")   Wt 80.9 kg (178 lb 4.8 oz)   BMI 23.50 kg/m²      Physical Exam  Awake, alert and oriented x 3  Speech f/c  Opens eyes spontaneously  Pupils 3 mm rx bilaterally  Extraocular muscles intact bilaterally  Face symmetric bilaterally  Tongue midline  5/5 in all 4 extremities    Social History    Tobacco Use      Smoking status: Never        Passive exposure: Past      Smokeless tobacco: Never       Tobacco Use: Low Risk  (7/29/2024)    Patient History     Smoking Tobacco Use: Never     Smokeless Tobacco Use: Never     Passive Exposure: Past           STEADI Fall Risk Assessment has not been completed.      Diagnostic Studies: (All imaging is independently reviewed unless stated otherwise.)  MRI pelvis shows multiple Tarlov cysts predominantly centered at S1-2 with abutment of bilateral descending S2 and S3 nerve roots.      Assessment/Plan    Diagnoses and all orders for this visit:    1. Right-sided low back pain without sciatica, unspecified chronicity (Primary)  -     Ambulatory Referral to Neurosurgery  -     Ambulatory Referral to Pain Management Clinic         This is a 53-year-old female who presents for evaluation of low back and right buttock pain.  She states physical therapy has helped but " she is worried about her MRI findings.  MRI pelvis shows multiple Tarlov cyst predominantly centered at S1-S2 with the abutment of bilateral descending S2 and S3 nerve roots.  I discussed these imaging with Dr. Glover who does not recommend surgical intervention on Tarlov cyst.  Patient would like a second opinion, therefore have given her referral.  I have also given her a referral to Dr. Parsons for possible injections to see if that helps her pain.  Recommend she continue physical therapy.  Will not schedule any formal follow-up, however patient has any new or worsening pain, she can call the office and I be happy see her back. Patient encouraged to contact us if she has any changes in her condition or any concerns.    Any copied data from previous notes included in the (1) HPI, (2) PE, (3) MDM and/or Assessment and Plan has been reviewed and accurate as of 07/29/24.    Brianna Jones PA-C  07/29/24

## 2024-08-12 ENCOUNTER — TREATMENT (OUTPATIENT)
Dept: PHYSICAL THERAPY | Facility: CLINIC | Age: 54
End: 2024-08-12
Payer: COMMERCIAL

## 2024-08-12 DIAGNOSIS — M53.3 SACROILIAC DYSFUNCTION: ICD-10-CM

## 2024-08-12 DIAGNOSIS — M25.551 PAIN OF RIGHT HIP: Primary | ICD-10-CM

## 2024-08-18 NOTE — PROGRESS NOTES
Physical Therapy Daily Treatment Note    [facility]   [address], [address] [place] [address]      Patient: Emiliana Garcia   : 1970  Referring practitioner: Brianna Jones PA-C  Date of Initial Visit: Type: THERAPY  Noted: 2024  Today's Date: 2024  Patient seen for 6 sessions    Certification Period 2024 thru 11/15/2024       Visit Diagnoses:    ICD-10-CM ICD-9-CM   1. Pain of right hip  M25.551 719.45   2. Sacroiliac dysfunction  M53.3 724.6       Subjective     Patient states that she feels that she has been able to maintain the improvement she is realized over the past several weeks and she has been able to do normal everyday walking without having increase in pain in the hip.  She has not attempted any longer walks at this time but plans to resume those soon to see how the hip response.    Objective   See Exercise, Manual, and Modality Logs for complete treatment.       Assessment/Plan     Continue with progression of activity in the clinic today focused on improving dynamic hip stability and general lower extremity strength.  Patient was able to form activity in the clinic without exacerbation of symptoms or excessive fatigue.  Will continue to progress as indicated.    Emiliana Garcia will continue to benefit from skilled physical therapy services to address deficits and continue to work towards reaching functional goals.           Timed:         Manual Therapy:         mins  10174;     Therapeutic Exercise:    14     mins  78831;     Neuromuscular Zeeshan:    30    mins  66305;    Therapeutic Activity:     10     mins  39736;     Gait Training:           mins  69969;     Ultrasound:          mins  14794;    Ionto                                   mins   63760  Self Care                            mins   46098  Canalith Repos         mins 96941  Electrical Stimulation:         mins  36410    Un-Timed:  Electrical Stimulation:         mins  65196 ( );  Dry Needling           mins self-pay  Traction          mins 08105      Timed Treatment:   54   mins   Total Treatment:     54   mins    Kimo Bloom PT, DPT, Cert. DN  [place] License: [id number]

## 2024-09-09 ENCOUNTER — TREATMENT (OUTPATIENT)
Dept: PHYSICAL THERAPY | Facility: CLINIC | Age: 54
End: 2024-09-09
Payer: COMMERCIAL

## 2024-09-09 DIAGNOSIS — M25.551 PAIN OF RIGHT HIP: Primary | ICD-10-CM

## 2024-09-09 DIAGNOSIS — M53.3 SACROILIAC DYSFUNCTION: ICD-10-CM

## 2024-09-13 NOTE — PROGRESS NOTES
Physical Therapy Daily Treatment Note    [facility]   [address], [address] [place] [address]      Patient: Emiliana Garcia   : 1970  Referring practitioner: Brianna Jones PA-C  Date of Initial Visit: Type: THERAPY  Noted: 2024  Today's Date: 2024  Patient seen for 7 sessions    Certification Period 2024 thru 12/10/2024       Visit Diagnoses:    ICD-10-CM ICD-9-CM   1. Pain of right hip  M25.551 719.45   2. Sacroiliac dysfunction  M53.3 724.6       Subjective     Patient states that she has been feeling good and she has not had an exacerbation of her hip pain over the past several weeks.  She is able to walk up to 3 miles at a time several days per week increase in pain as well.  She has not yet taken a longer walk as she had the onset of her symptoms    Objective   See Exercise, Manual, and Modality Logs for complete treatment.       Assessment/Plan     Patient is making regular progress with therapy and she demonstrates improvement in her bilateral hip strength and stability and tolerance to activity in the clinic.  Patient was encouraged to continue to walk longer distances to help improve endurance and to see if she has any painful response.  Will continue to progress activity as indicated.    Emiliana Garcia will continue to benefit from skilled physical therapy services to address deficits and continue to work towards reaching functional goals.           Timed:         Manual Therapy:    8     mins  74077;     Therapeutic Exercise:    10     mins  38942;     Neuromuscular Zeeshan:    20    mins  95112;    Therapeutic Activity:     18     mins  36864;     Gait Training:           mins  16803;     Ultrasound:          mins  54037;    Ionto                                   mins   17918  Self Care                            mins   55118  Canalith Repos         mins 69857  Electrical Stimulation:         mins  85774    Un-Timed:  Electrical Stimulation:         mins  93292 (  );  Dry Needling          mins self-pay  Traction          mins 36835      Timed Treatment:   56   mins   Total Treatment:     56   mins    Kimo Bloom PT, DPT, Cert. DN  [place] License: [id number]

## 2024-11-08 ENCOUNTER — LAB (OUTPATIENT)
Dept: LAB | Facility: HOSPITAL | Age: 54
End: 2024-11-08
Payer: COMMERCIAL

## 2024-11-08 ENCOUNTER — OFFICE VISIT (OUTPATIENT)
Dept: INTERNAL MEDICINE | Facility: CLINIC | Age: 54
End: 2024-11-08
Payer: COMMERCIAL

## 2024-11-08 VITALS
SYSTOLIC BLOOD PRESSURE: 104 MMHG | TEMPERATURE: 96.3 F | BODY MASS INDEX: 24.62 KG/M2 | HEIGHT: 72 IN | WEIGHT: 181.8 LBS | OXYGEN SATURATION: 97 % | DIASTOLIC BLOOD PRESSURE: 68 MMHG

## 2024-11-08 DIAGNOSIS — I87.1 MAY-THURNER SYNDROME: ICD-10-CM

## 2024-11-08 DIAGNOSIS — G96.191 TARLOV CYSTS: ICD-10-CM

## 2024-11-08 DIAGNOSIS — N91.2 AMENORRHEA: ICD-10-CM

## 2024-11-08 DIAGNOSIS — J30.9 ALLERGIC RHINITIS, UNSPECIFIED SEASONALITY, UNSPECIFIED TRIGGER: ICD-10-CM

## 2024-11-08 DIAGNOSIS — E78.2 MODERATE MIXED HYPERLIPIDEMIA NOT REQUIRING STATIN THERAPY: ICD-10-CM

## 2024-11-08 DIAGNOSIS — J30.0 VASOMOTOR RHINITIS: ICD-10-CM

## 2024-11-08 DIAGNOSIS — R10.32 LLQ PAIN: ICD-10-CM

## 2024-11-08 DIAGNOSIS — G43.909 MIGRAINE WITHOUT STATUS MIGRAINOSUS, NOT INTRACTABLE, UNSPECIFIED MIGRAINE TYPE: ICD-10-CM

## 2024-11-08 DIAGNOSIS — M81.0 OSTEOPOROSIS WITHOUT CURRENT PATHOLOGICAL FRACTURE, UNSPECIFIED OSTEOPOROSIS TYPE: ICD-10-CM

## 2024-11-08 DIAGNOSIS — I34.1 MITRAL VALVE PROLAPSE: ICD-10-CM

## 2024-11-08 DIAGNOSIS — Z00.00 ANNUAL PHYSICAL EXAM: Primary | ICD-10-CM

## 2024-11-08 DIAGNOSIS — Z00.00 ANNUAL PHYSICAL EXAM: ICD-10-CM

## 2024-11-08 DIAGNOSIS — M16.0 PRIMARY OSTEOARTHRITIS OF BOTH HIPS: ICD-10-CM

## 2024-11-08 PROBLEM — I78.1 SENILE ANGIOMA: Status: ACTIVE | Noted: 2023-12-27

## 2024-11-08 PROBLEM — L25.9 CONTACT DERMATITIS: Status: ACTIVE | Noted: 2023-12-27

## 2024-11-08 PROBLEM — D22.9 MULTIPLE BENIGN MELANOCYTIC NEVI: Status: ACTIVE | Noted: 2023-12-27

## 2024-11-08 PROBLEM — L82.1 SEBORRHEIC KERATOSIS: Status: ACTIVE | Noted: 2023-12-27

## 2024-11-08 PROBLEM — L81.4 SOLAR LENTIGO: Status: ACTIVE | Noted: 2023-12-27

## 2024-11-08 LAB
ALBUMIN SERPL-MCNC: 4.4 G/DL (ref 3.5–5.2)
ALBUMIN/GLOB SERPL: 1.8 G/DL
ALP SERPL-CCNC: 67 U/L (ref 39–117)
ALT SERPL W P-5'-P-CCNC: 17 U/L (ref 1–33)
ANION GAP SERPL CALCULATED.3IONS-SCNC: 8.5 MMOL/L (ref 5–15)
AST SERPL-CCNC: 19 U/L (ref 1–32)
BILIRUB SERPL-MCNC: 0.4 MG/DL (ref 0–1.2)
BUN SERPL-MCNC: 18 MG/DL (ref 6–20)
BUN/CREAT SERPL: 23.1 (ref 7–25)
CALCIUM SPEC-SCNC: 9.4 MG/DL (ref 8.6–10.5)
CHLORIDE SERPL-SCNC: 104 MMOL/L (ref 98–107)
CHOLEST SERPL-MCNC: 170 MG/DL (ref 0–200)
CHROMATIN AB SERPL-ACNC: <10 IU/ML (ref 0–14)
CO2 SERPL-SCNC: 27.5 MMOL/L (ref 22–29)
CREAT SERPL-MCNC: 0.78 MG/DL (ref 0.57–1)
CRP SERPL-MCNC: <0.3 MG/DL (ref 0–0.5)
DEPRECATED RDW RBC AUTO: 40.4 FL (ref 37–54)
EGFRCR SERPLBLD CKD-EPI 2021: 91.5 ML/MIN/1.73
ERYTHROCYTE [DISTWIDTH] IN BLOOD BY AUTOMATED COUNT: 12.4 % (ref 12.3–15.4)
ESTRADIOL SERPL HS-MCNC: 25.2 PG/ML
FSH SERPL-ACNC: 32.5 MIU/ML
GLOBULIN UR ELPH-MCNC: 2.4 GM/DL
GLUCOSE SERPL-MCNC: 66 MG/DL (ref 65–99)
HBA1C MFR BLD: 5.1 % (ref 4.8–5.6)
HCT VFR BLD AUTO: 40.6 % (ref 34–46.6)
HDLC SERPL-MCNC: 55 MG/DL (ref 40–60)
HGB BLD-MCNC: 14 G/DL (ref 12–15.9)
LDLC SERPL CALC-MCNC: 88 MG/DL (ref 0–100)
LDLC/HDLC SERPL: 1.53 {RATIO}
LH SERPL-ACNC: 23.5 MIU/ML
MAGNESIUM SERPL-MCNC: 2 MG/DL (ref 1.6–2.6)
MCH RBC QN AUTO: 30.8 PG (ref 26.6–33)
MCHC RBC AUTO-ENTMCNC: 34.5 G/DL (ref 31.5–35.7)
MCV RBC AUTO: 89.4 FL (ref 79–97)
PHOSPHATE SERPL-MCNC: 3.6 MG/DL (ref 2.5–4.5)
PLATELET # BLD AUTO: 140 10*3/MM3 (ref 140–450)
PMV BLD AUTO: 12.1 FL (ref 6–12)
POTASSIUM SERPL-SCNC: 4.4 MMOL/L (ref 3.5–5.2)
PROLACTIN SERPL-MCNC: 10.2 NG/ML (ref 4.79–23.3)
PROT SERPL-MCNC: 6.8 G/DL (ref 6–8.5)
PTH-INTACT SERPL-MCNC: 31.3 PG/ML (ref 15–65)
RBC # BLD AUTO: 4.54 10*6/MM3 (ref 3.77–5.28)
SODIUM SERPL-SCNC: 140 MMOL/L (ref 136–145)
TRIGL SERPL-MCNC: 154 MG/DL (ref 0–150)
TSH SERPL DL<=0.05 MIU/L-ACNC: 1.19 UIU/ML (ref 0.27–4.2)
VLDLC SERPL-MCNC: 27 MG/DL (ref 5–40)
WBC NRBC COR # BLD AUTO: 4.1 10*3/MM3 (ref 3.4–10.8)

## 2024-11-08 PROCEDURE — 83001 ASSAY OF GONADOTROPIN (FSH): CPT

## 2024-11-08 PROCEDURE — 80050 GENERAL HEALTH PANEL: CPT

## 2024-11-08 PROCEDURE — 80061 LIPID PANEL: CPT

## 2024-11-08 PROCEDURE — 86258 DGP ANTIBODY EACH IG CLASS: CPT

## 2024-11-08 PROCEDURE — 86140 C-REACTIVE PROTEIN: CPT

## 2024-11-08 PROCEDURE — 86364 TISS TRNSGLTMNASE EA IG CLAS: CPT

## 2024-11-08 PROCEDURE — 84146 ASSAY OF PROLACTIN: CPT

## 2024-11-08 PROCEDURE — 83735 ASSAY OF MAGNESIUM: CPT

## 2024-11-08 PROCEDURE — 86431 RHEUMATOID FACTOR QUANT: CPT

## 2024-11-08 PROCEDURE — 86231 EMA EACH IG CLASS: CPT

## 2024-11-08 PROCEDURE — 83970 ASSAY OF PARATHORMONE: CPT

## 2024-11-08 PROCEDURE — 83002 ASSAY OF GONADOTROPIN (LH): CPT | Performed by: INTERNAL MEDICINE

## 2024-11-08 PROCEDURE — 82670 ASSAY OF TOTAL ESTRADIOL: CPT | Performed by: INTERNAL MEDICINE

## 2024-11-08 PROCEDURE — 99396 PREV VISIT EST AGE 40-64: CPT | Performed by: INTERNAL MEDICINE

## 2024-11-08 PROCEDURE — 84100 ASSAY OF PHOSPHORUS: CPT

## 2024-11-08 PROCEDURE — 82306 VITAMIN D 25 HYDROXY: CPT

## 2024-11-08 PROCEDURE — 82784 ASSAY IGA/IGD/IGG/IGM EACH: CPT

## 2024-11-08 PROCEDURE — 83036 HEMOGLOBIN GLYCOSYLATED A1C: CPT

## 2024-11-08 PROCEDURE — 85652 RBC SED RATE AUTOMATED: CPT

## 2024-11-08 PROCEDURE — 86200 CCP ANTIBODY: CPT

## 2024-11-08 NOTE — PROGRESS NOTES
Internal Medicine Annual Exam  Emiliana Garcia is a 54 y.o. female who presents today for an annual exam and with concerns as outlined below.    Chief Complaint  Chief Complaint   Patient presents with    Annual Exam        HPI  Ms. Garcia comes in today for her physical. She notes issues with back and hip pain after a marathon this spring. She developed leg weakness, shakiness, whole body numbness during the race. She reports adequately hydrating. She subsequently saw neurosurgery who did xray and referred her to orthopedics. There she was ordered an MRI showing large tarlov cysts measuring up to 6.1cm with abutment of the S2-3 nerve root and L5-S1 marrow edema. She was advised that surgery was not indicated. She was referred to [facility] [place] for a second opinion as well as pain mgmt. She has not made an appointment with either. She is feeling better but notes she has not been as active. She hopes to ease back into long distance walking and will be training for a half marathon. She was found to have progressed from osteopenia to osteoporosis last year. Deferred bisphosphonate and remains hesitant today. She believes GYN has told her she is perimenopausal. She had endometrial ablation many years ago and has had amenorrhea since then. Began having hot flashes ~5-6y ago. She notes pulling sensation in LLQ which is painful. Started 2 months ago. She defers flu and COVID vaccine today. She is up to date with dental, vision, skin exam. Colonoscopy is up to date. She plans to schedule mammogram in December. Denies use of tobacco, ecigarettes, illicit drugs, and drinks very minimal alcohol.           Review of Systems  Review of Systems   Constitutional:  Positive for activity change. Negative for fatigue.   HENT: Negative.     Respiratory: Negative.     Cardiovascular: Negative.    Gastrointestinal:  Positive for abdominal pain. Negative for anal bleeding, blood in stool, constipation and diarrhea.   Genitourinary: Negative.     Musculoskeletal:  Positive for arthralgias and back pain. Negative for gait problem.   Skin: Negative.    Neurological:  Positive for numbness (now resolved). Negative for weakness.   Psychiatric/Behavioral: Negative.          Past Medical History  Past Medical History:   Diagnosis Date    Abnormal mammogram of left breast 09/10/2021    Allergic     Closed fracture of distal end of radius 09/12/2016    Closed fracture of distal end of tibia 04/25/2016    Closed fracture of styloid process of ulna 09/12/2016    Deep vein thrombosis     Elbow joint pain 09/12/2016    Fracture of wrist     Fractures     Heart murmur     MVP    History of medical problems     Chronic pain right hip    Low back pain Feb 24    May-Thurner syndrome     Migraine     Mitral valve prolapse     Osteopenia     Pain in joint involving ankle and foot 04/06/2016    From Automated Load;Provider: Carlton Muñoz;Status: Active    Pain in left foot 04/06/2016    Sprain of ankle 10/07/2016    Stress fracture of tibia 05/20/2016    Thrombocytopenia 09/09/2019    Vasomotor rhinitis     Visual impairment     History of dry eyes, cornea scratches, staph marginal ulcers        Surgical History  Past Surgical History:   Procedure Laterality Date    AUGMENTATION MAMMAPLASTY      2012    BREAST BIOPSY Left 08/05/2021    COLONOSCOPY  1-    One benign hyperplastic polyp    ENDOMETRIAL ABLATION      Uterine ablation to alleviate heavy periods    EPIDURAL BLOCK  April 2004    During labor    FRACTURE SURGERY      Repair broken left wrist with titanium plate and 8 screws    WRIST FRACTURE SURGERY Left 2016        Family History  Family History   Problem Relation Age of Onset    Lupus Mother         skin involvement    Stroke Mother         October 2022    Alzheimer's disease Mother     Mental illness Mother         depression    Anesthesia problems Mother     Hypertension Father     Alzheimer's disease Father     Prostate cancer Father     Skin cancer Father          melanoma    Cancer Father     Lupus Sister     Thyroid disease Brother     Osteoporosis Maternal Grandmother     Alzheimer's disease Maternal Grandmother     Stroke Maternal Grandfather     Heart attack Paternal Grandmother     Hypertension Paternal Grandmother     Breast cancer Neg Hx     Ovarian cancer Neg Hx         Social History  Social History     Socioeconomic History    Marital status:    Tobacco Use    Smoking status: Never     Passive exposure: Past    Smokeless tobacco: Never   Vaping Use    Vaping status: Never Used   Substance and Sexual Activity    Alcohol use: Yes     Alcohol/week: 1.0 standard drink of alcohol     Types: 1 Drinks containing 0.5 oz of alcohol per week     Comment: a drink with special occasions, maybe once a month at most    Drug use: Never    Sexual activity: Yes     Partners: Male     Birth control/protection: Essure     Comment: With         Current Medications  Current Outpatient Medications on File Prior to Visit   Medication Sig Dispense Refill    ipratropium (ATROVENT) 0.03 % nasal spray Administer 2 sprays into the nostril(s) as directed by provider Daily As Needed.      loratadine (CLARITIN) 10 MG tablet Take 1 tablet by mouth Every Other Day.      Omega-3 Fatty Acids (fish oil) 1000 MG capsule capsule Take 1 capsule by mouth Daily With Breakfast.      Singulair 10 MG tablet Take 1 tablet by mouth Daily.      spironolactone (ALDACTONE) 50 MG tablet Take 1 tablet by mouth 2 (Two) Times a Day.  1    [DISCONTINUED] NASAL SALINE NA into the nostril(s) as directed by provider 2 (Two) Times a Day. (Patient not taking: Reported on 11/8/2024)       No current facility-administered medications on file prior to visit.       Allergies  Allergies   Allergen Reactions    Bactrim [Sulfamethoxazole-Trimethoprim] Itching     All over itching, chills, jittery    Erythromycin Other (See Comments)     Stomach pains, rash    Hydrocodone Other (See Comments)    Lorabid  "[Loracarbef] Other (See Comments)     Stomach pains, rash      Penicillins Other (See Comments)     Allergy unknown, happened as a child        Objective  Visit Vitals  /68 (BP Location: Left arm, Patient Position: Sitting, Cuff Size: Adult)   Temp 96.3 °F (35.7 °C) (Temporal)   Ht 184.5 cm (72.64\")   Wt 82.5 kg (181 lb 12.8 oz)   SpO2 97%   BMI 24.23 kg/m²        Physical Exam  Physical Exam  Vitals and nursing note reviewed.   Constitutional:       General: She is not in acute distress.     Appearance: Normal appearance. She is well-developed and normal weight. She is not ill-appearing, toxic-appearing or diaphoretic.   HENT:      Head: Normocephalic and atraumatic.      Right Ear: Tympanic membrane, ear canal and external ear normal.      Left Ear: Tympanic membrane, ear canal and external ear normal.      Nose: Nose normal.   Eyes:      General: No scleral icterus.     Conjunctiva/sclera: Conjunctivae normal.   Neck:      Thyroid: No thyromegaly.   Cardiovascular:      Rate and Rhythm: Normal rate and regular rhythm.      Heart sounds: Normal heart sounds. No murmur heard.  Pulmonary:      Effort: Pulmonary effort is normal. No respiratory distress.      Breath sounds: Normal breath sounds.   Abdominal:      General: There is no distension.      Palpations: Abdomen is soft. There is no mass.      Tenderness: There is no abdominal tenderness.      Hernia: A hernia (possible hernia appreciated in LLQ with valsalva) is present.   Musculoskeletal:         General: No deformity.      Cervical back: Neck supple.      Right lower leg: No edema.      Left lower leg: No edema.   Lymphadenopathy:      Cervical: No cervical adenopathy.   Skin:     General: Skin is warm and dry.      Findings: Erythema present. No rash.      Comments: Facial erythema which is most pronounced on chin and cheeks   Neurological:      Mental Status: She is alert and oriented to person, place, and time. Mental status is at baseline.      " Gait: Gait normal.   Psychiatric:         Mood and Affect: Mood normal.         Behavior: Behavior normal.         Thought Content: Thought content normal.         Judgment: Judgment normal.          Results  Results for orders placed or performed in visit on 10/13/23   CBC (No Diff)    Collection Time: 10/13/23 10:38 AM    Specimen: Blood   Result Value Ref Range    WBC 6.52 3.40 - 10.80 10*3/mm3    RBC 5.00 3.77 - 5.28 10*6/mm3    Hemoglobin 14.9 12.0 - 15.9 g/dL    Hematocrit 44.4 34.0 - 46.6 %    MCV 88.8 79.0 - 97.0 fL    MCH 29.8 26.6 - 33.0 pg    MCHC 33.6 31.5 - 35.7 g/dL    RDW 13.0 12.3 - 15.4 %    RDW-SD 41.7 37.0 - 54.0 fl    MPV 12.4 (H) 6.0 - 12.0 fL    Platelets 177 140 - 450 10*3/mm3   Comprehensive Metabolic Panel    Collection Time: 10/13/23 10:38 AM    Specimen: Blood   Result Value Ref Range    Glucose 60 (L) 65 - 99 mg/dL    BUN 18 6 - 20 mg/dL    Creatinine 1.05 (H) 0.57 - 1.00 mg/dL    Sodium 140 136 - 145 mmol/L    Potassium 4.7 3.5 - 5.2 mmol/L    Chloride 101 98 - 107 mmol/L    CO2 24.1 22.0 - 29.0 mmol/L    Calcium 10.3 8.6 - 10.5 mg/dL    Total Protein 7.6 6.0 - 8.5 g/dL    Albumin 4.8 3.5 - 5.2 g/dL    ALT (SGPT) 17 1 - 33 U/L    AST (SGOT) 26 1 - 32 U/L    Alkaline Phosphatase 72 39 - 117 U/L    Total Bilirubin 0.6 0.0 - 1.2 mg/dL    Globulin 2.8 gm/dL    A/G Ratio 1.7 g/dL    BUN/Creatinine Ratio 17.1 7.0 - 25.0    Anion Gap 14.9 5.0 - 15.0 mmol/L    eGFR 64.5 >60.0 mL/min/1.73   Lipid Panel    Collection Time: 10/13/23 10:38 AM    Specimen: Blood   Result Value Ref Range    Total Cholesterol 181 0 - 200 mg/dL    Triglycerides 87 0 - 150 mg/dL    HDL Cholesterol 64 (H) 40 - 60 mg/dL    LDL Cholesterol  101 (H) 0 - 100 mg/dL    VLDL Cholesterol 16 5 - 40 mg/dL    LDL/HDL Ratio 1.56    Hemoglobin A1c    Collection Time: 10/13/23 10:38 AM    Specimen: Blood   Result Value Ref Range    Hemoglobin A1C 5.30 4.80 - 5.60 %   Vitamin D,25-Hydroxy    Collection Time: 10/13/23 10:38 AM     Specimen: Blood   Result Value Ref Range    25 Hydroxy, Vitamin D 41.1 30.0 - 100.0 ng/ml   TSH Rfx On Abnormal To Free T4    Collection Time: 10/13/23 10:38 AM    Specimen: Blood   Result Value Ref Range    TSH 1.120 0.270 - 4.200 uIU/mL        Assessment and Plan  Diagnoses and all orders for this visit:    Annual physical exam  - Counseling was given to patient for the following topics:  importance of self breast exam and breast health, importance of immunizations, including risks and benefits, and bone health. Also discussed the importance of regular dental and vision care, as well recommendation for a yearly screening skin exam.  -     Lipid Panel; Future  -     Hemoglobin A1c; Future    Tarlov cysts  - noted on MRI pelvis 6/2024 and measuring 6.1cm with abutment of the S2-3 nerve root.  - prior symptoms have resolved  - NSG deferred surgical intervention. She is considering second opinion if sxs were to recur as she increases physical activity.    Primary osteoarthritis of both hips  - moderate on imaging  - improving with PT exercises    May-Thurner syndrome  - Affecting LLE, no history of DVT and no signs of clot today  - Stable    Migraine without status migrainosus, not intractable, unspecified migraine type  - stable    Mitral valve prolapse  - records with echo from 2009 showing mild holosystolic mitral valve prolapse and trace MR/TR.   - Clinically asymptomatic. No significant murmur on exam.  - Monitor    Moderate mixed hyperlipidemia not requiring statin therapy  - On fish oil with stable LDL 130s  - recheck lipid panel    Vasomotor rhinitis  - Follows with ENT and on ipratropium nasal spray     Allergic rhinitis, unspecified seasonality, unspecified trigger  - Follows with ENT, on singulair and claritin     Osteoporosis without current pathological fracture, unspecified osteoporosis type  - DEXA 12/2023 with declining BMD and now with osteoporosis. Noted she is young however possible she has been  menopausal for >5y.  - will proceed with workup for secondary causes of osteoporosis.  -     CBC (No Diff); Future  -     Comprehensive Metabolic Panel; Future  -     Follicle Stimulating Hormone; Future  -     TSH Rfx On Abnormal To Free T4; Future  -     Vitamin D,25-Hydroxy; Future  -     Phosphorus; Future  -     Magnesium; Future  -     PTH, Intact; Future  -     Calcium, Urine, 24 Hour - Urine, Clean Catch; Future  -     Creatinine Urine 24 hour (kidney function) GFR component - Urine, Clean Catch; Future  -     Estradiol  -     Prolactin; Future  -     Celiac Comprehensive Panel; Future  -     Rheumatoid Factor, Quant; Future  -     Cyclic Citrul Peptide Antibody, IgG / IgA; Future  -     Cortisol, Urine, Free 24Hr - Urine, Clean Catch; Future  -     Sedimentation rate, automated; Future  -     C-reactive Protein; Future  -     Luteinizing Hormone    Amenorrhea  - since endometrial ablation, possibly menopausal given hot flashes  - will check FSH, LH, estradiol, prolactin, TSH today    LLQ pain  - possible hernia on exam, US ordered       Health Maintenance   Topic Date Due    PAP SMEAR  02/01/2022    ANNUAL PHYSICAL  10/13/2024    LIPID PANEL  10/13/2024    MAMMOGRAM  12/15/2024    COVID-19 Vaccine (6 - 2024-25 season) 11/10/2024 (Originally 9/1/2024)    INFLUENZA VACCINE  03/31/2025 (Originally 8/1/2024)    DXA SCAN  12/01/2025    TDAP/TD VACCINES (3 - Td or Tdap) 09/29/2032    COLORECTAL CANCER SCREENING  01/16/2033    HEPATITIS C SCREENING  Completed    ZOSTER VACCINE  Completed    Pneumococcal Vaccine 0-64  Aged Out     Health Maintenance  - Pap smear: UTD, [facility]  - Mammogram: she will schedule  - Colonoscopy: 1/2023, due 2033  - HCV: negative  - Immunizations: Tdap 9/2022. Shingrix complete. COVID and Flu declined.    Return in about 4 weeks (around 12/6/2024) for Follow up labs, ultrasound. 1 year for annual, Labs today.

## 2024-11-09 LAB
25(OH)D3 SERPL-MCNC: 28.9 NG/ML (ref 30–100)
ERYTHROCYTE [SEDIMENTATION RATE] IN BLOOD: 3 MM/HR (ref 0–30)

## 2024-11-11 ENCOUNTER — LAB (OUTPATIENT)
Dept: LAB | Facility: HOSPITAL | Age: 54
End: 2024-11-11
Payer: COMMERCIAL

## 2024-11-11 DIAGNOSIS — M81.0 OSTEOPOROSIS WITHOUT CURRENT PATHOLOGICAL FRACTURE, UNSPECIFIED OSTEOPOROSIS TYPE: ICD-10-CM

## 2024-11-11 LAB
CALCIUM 24H UR-MCNC: 8.7 MG/DL
CALCIUM 24H UR-MRATE: 269.7 MG/24 HR (ref 100–300)
CCP IGA+IGG SERPL IA-ACNC: 7 UNITS (ref 0–19)
COLLECT DURATION TIME UR: 24 HRS
COLLECT DURATION TIME UR: 24 HRS
CREAT UR-MCNC: 42.5 MG/DL
CREATINE 24H UR-MRATE: 1.32 G/24 HR (ref 0.7–1.6)
ENDOMYSIUM IGA SER QL: NEGATIVE
GLIADIN PEPTIDE IGA SER-ACNC: 2 UNITS (ref 0–19)
GLIADIN PEPTIDE IGG SER-ACNC: 3 UNITS (ref 0–19)
IGA SERPL-MCNC: 131 MG/DL (ref 87–352)
SPECIMEN VOL 24H UR: 3100 ML
SPECIMEN VOL 24H UR: 3100 ML
TTG IGA SER-ACNC: <2 U/ML (ref 0–3)
TTG IGG SER-ACNC: 4 U/ML (ref 0–5)

## 2024-11-11 PROCEDURE — 82340 ASSAY OF CALCIUM IN URINE: CPT

## 2024-11-11 PROCEDURE — 81050 URINALYSIS VOLUME MEASURE: CPT

## 2024-11-11 PROCEDURE — 82570 ASSAY OF URINE CREATININE: CPT

## 2024-11-11 PROCEDURE — 82530 CORTISOL FREE: CPT

## 2024-11-12 ENCOUNTER — TRANSCRIBE ORDERS (OUTPATIENT)
Dept: ADMINISTRATIVE | Facility: HOSPITAL | Age: 54
End: 2024-11-12
Payer: COMMERCIAL

## 2024-11-12 DIAGNOSIS — Z12.31 SCREENING MAMMOGRAM FOR BREAST CANCER: Primary | ICD-10-CM

## 2024-11-14 LAB
CORTIS F 24H UR-MCNC: 10 UG/L
CORTIS F 24H UR-MRATE: 30 UG/24 HR (ref 6–42)

## 2024-12-04 ENCOUNTER — TRANSCRIBE ORDERS (OUTPATIENT)
Dept: ADMINISTRATIVE | Facility: HOSPITAL | Age: 54
End: 2024-12-04
Payer: COMMERCIAL

## 2024-12-04 DIAGNOSIS — R92.8 ABNORMAL FINDINGS ON DIAGNOSTIC IMAGING OF BREAST: Primary | ICD-10-CM

## 2024-12-04 DIAGNOSIS — R92.8 ABNORMAL FINDING ON BREAST IMAGING: ICD-10-CM

## 2024-12-20 ENCOUNTER — HOSPITAL ENCOUNTER (OUTPATIENT)
Dept: ULTRASOUND IMAGING | Facility: HOSPITAL | Age: 54
Discharge: HOME | End: 2024-12-20
Admitting: INTERNAL MEDICINE
Payer: COMMERCIAL

## 2024-12-20 DIAGNOSIS — R10.32 LLQ PAIN: ICD-10-CM

## 2024-12-20 PROCEDURE — 76705 ECHO EXAM OF ABDOMEN: CPT

## 2024-12-23 ENCOUNTER — OFFICE VISIT (OUTPATIENT)
Dept: INTERNAL MEDICINE | Facility: CLINIC | Age: 54
End: 2024-12-23
Payer: COMMERCIAL

## 2024-12-23 VITALS
SYSTOLIC BLOOD PRESSURE: 112 MMHG | DIASTOLIC BLOOD PRESSURE: 68 MMHG | WEIGHT: 182.8 LBS | BODY MASS INDEX: 24.36 KG/M2 | TEMPERATURE: 96.9 F | OXYGEN SATURATION: 98 %

## 2024-12-23 DIAGNOSIS — K40.90 NON-RECURRENT UNILATERAL INGUINAL HERNIA WITHOUT OBSTRUCTION OR GANGRENE: ICD-10-CM

## 2024-12-23 DIAGNOSIS — M81.0 POSTMENOPAUSAL OSTEOPOROSIS: Primary | ICD-10-CM

## 2024-12-23 PROCEDURE — 99214 OFFICE O/P EST MOD 30 MIN: CPT | Performed by: INTERNAL MEDICINE

## 2024-12-23 RX ORDER — ALENDRONATE SODIUM 70 MG/1
70 TABLET ORAL
Qty: 12 TABLET | Refills: 3 | Status: SHIPPED | OUTPATIENT
Start: 2024-12-23

## 2024-12-23 NOTE — PROGRESS NOTES
Internal Medicine Follow Up    Chief Complaint  Emiliana Garcia is a 54 y.o. female who presents today for follow up of chronic medical conditions outlined below.    Chief Complaint   Patient presents with    Abdominal Pain     Is not as pronounced    Abnormal Lab     Would like to discuss labs        HPI  Ms. Garcia comes in today for follow up. Had labs for osteoporosis and would like to discuss results. Also recently had US for suspected inguinal hernia. Pain less but still feels bulge.     Abdominal Pain  Pertinent negatives include no anorexia, dysuria, fever, headaches, myalgias, nausea or vomiting.   Abnormal Lab  Symptoms include abdominal pain.    Pertinent negative symptoms include no anorexia, no joint pain, no change in stool, no chest pain, no chills, no congestion, no cough, no diaphoresis, no fatigue, no fever, no headaches, no joint swelling, no myalgias, no nausea, no neck pain, no numbness, no rash, no sore throat, no swollen glands, no dysuria, no vertigo, no visual change, no vomiting and no weakness.     Physical follow up and lab results  Symptoms include: abdominal pain.   Pertinent negative symptoms include no anorexia, no joint pain, no change in stool, no chest pain, no chills, no congestion, no cough, no diaphoresis, no fatigue, no fever, no headaches, no joint swelling, no myalgias, no nausea, no neck pain, no numbness, no rash, no sore throat, no swollen glands, no dysuria, no vertigo, no visual change, no vomiting and no weakness.   Treatment and/or Medications comments include: None   Additional information: Physical follow up       Review of Systems  Review of Systems   Constitutional:  Negative for chills, diaphoresis, fatigue and fever.   HENT:  Negative for congestion, sore throat and swollen glands.    Respiratory:  Negative for cough.    Cardiovascular:  Negative for chest pain.   Gastrointestinal:  Positive for abdominal pain. Negative for anorexia, nausea and vomiting.    Genitourinary:  Negative for dysuria.   Musculoskeletal:  Negative for joint pain, myalgias and neck pain.   Skin:  Negative for rash.   Neurological:  Negative for vertigo, weakness and numbness.        Current Medications  Current Outpatient Medications on File Prior to Visit   Medication Sig Dispense Refill    ipratropium (ATROVENT) 0.03 % nasal spray Administer 2 sprays into the nostril(s) as directed by provider Daily As Needed.      loratadine (CLARITIN) 10 MG tablet Take 1 tablet by mouth Every Other Day.      Singulair 10 MG tablet Take 1 tablet by mouth Daily.      spironolactone (ALDACTONE) 50 MG tablet Take 1 tablet by mouth 2 (Two) Times a Day.  1    [DISCONTINUED] Omega-3 Fatty Acids (fish oil) 1000 MG capsule capsule Take 1 capsule by mouth Daily With Breakfast. (Patient not taking: Reported on 12/23/2024)       No current facility-administered medications on file prior to visit.       Allergies  Allergies   Allergen Reactions    Bactrim [Sulfamethoxazole-Trimethoprim] Itching     All over itching, chills, jittery    Erythromycin Other (See Comments)     Stomach pains, rash    Hydrocodone Other (See Comments)    Lorabid [Loracarbef] Other (See Comments)     Stomach pains, rash      Penicillins Other (See Comments)     Allergy unknown, happened as a child       Objective  Visit Vitals  /68 (BP Location: Left arm, Patient Position: Sitting, Cuff Size: Adult)   Temp 96.9 °F (36.1 °C) (Temporal)   Wt 82.9 kg (182 lb 12.8 oz)   SpO2 98%   BMI 24.36 kg/m²        Physical Exam  Physical Exam  Vitals and nursing note reviewed.   Constitutional:       General: She is not in acute distress.     Appearance: She is well-developed. She is not ill-appearing or toxic-appearing.   HENT:      Head: Normocephalic and atraumatic.   Eyes:      Conjunctiva/sclera: Conjunctivae normal.   Pulmonary:      Effort: Pulmonary effort is normal. No respiratory distress.   Skin:     General: Skin is warm and dry.    Neurological:      Mental Status: She is alert and oriented to person, place, and time. Mental status is at baseline.      Gait: Gait normal.         Results  Results for orders placed or performed in visit on 11/11/24   Calcium, Urine, 24 Hour - Urine, Clean Catch    Collection Time: 11/11/24  9:20 AM    Specimen: Urine, Clean Catch; 24 Hour Urine   Result Value Ref Range    Calcium, 24H Urine 269.7 100.0 - 300.0 mg/24 hr    Calcium, Urine 8.7 mg/dL    24H Urine Volume 3,100 mL    Time (Hours) 24 hrs   Creatinine Urine 24 hour (kidney function) GFR component - Urine, Clean Catch    Collection Time: 11/11/24  9:20 AM    Specimen: Urine, Clean Catch; 24 Hour Urine   Result Value Ref Range    Creatinine, 24H 1.32 0.70 - 1.60 g/24 hr    Creatinine, Urine 42.5 mg/dL    24H Urine Volume 3,100 mL    Time (Hours) 24 hrs   Cortisol, Urine, Free 24Hr - Urine, Clean Catch    Collection Time: 11/11/24  9:20 AM    Specimen: Urine, Clean Catch; 24 Hour Urine   Result Value Ref Range    Cortisol, Free 10 Undefined ug/L    Cortisol, 24H Ur 30 6 - 42 ug/24 hr        Assessment and Plan  Diagnoses and all orders for this visit:    Postmenopausal osteoporosis  - DEXA 12/2023 with declining BMD and now with osteoporosis. Noted she is young however possible she has been menopausal for >5y.   - labs to rule out secondary causes negative, confirm likely postmenopausal  - will start fosamax 70mg weekly  - next DEXA 12/2025    Non-recurrent unilateral inguinal hernia without obstruction or gangrene  - small 4-5mm, fat containing, and minimally symptomatic so will monitor     Health Maintenance  - Pap smear: UTD, [facility]  - Mammogram: scheduled  - Colonoscopy: 1/2023, due 2033  - HCV: negative  - Immunizations: Tdap 9/2022. Shingrix complete. COVID and Flu declined.    Return for Next scheduled follow up.

## 2025-01-21 LAB
NCCN CRITERIA FLAG: NORMAL
TYRER CUZICK SCORE: 11.1

## 2025-01-27 ENCOUNTER — HOSPITAL ENCOUNTER (OUTPATIENT)
Dept: ULTRASOUND IMAGING | Facility: HOSPITAL | Age: 55
Discharge: HOME | End: 2025-01-27
Payer: COMMERCIAL

## 2025-01-27 ENCOUNTER — HOSPITAL ENCOUNTER (OUTPATIENT)
Dept: MAMMOGRAPHY | Facility: HOSPITAL | Age: 55
Discharge: HOME | End: 2025-01-27
Payer: COMMERCIAL

## 2025-01-27 DIAGNOSIS — R92.8 ABNORMAL FINDING ON BREAST IMAGING: ICD-10-CM

## 2025-01-27 PROCEDURE — 77062 BREAST TOMOSYNTHESIS BI: CPT | Performed by: RADIOLOGY

## 2025-01-27 PROCEDURE — 77066 DX MAMMO INCL CAD BI: CPT | Performed by: RADIOLOGY

## 2025-01-27 PROCEDURE — 76642 ULTRASOUND BREAST LIMITED: CPT

## 2025-01-27 PROCEDURE — 76642 ULTRASOUND BREAST LIMITED: CPT | Performed by: RADIOLOGY

## 2025-01-27 PROCEDURE — G0279 TOMOSYNTHESIS, MAMMO: HCPCS

## 2025-01-27 PROCEDURE — 77066 DX MAMMO INCL CAD BI: CPT

## 2025-03-27 ENCOUNTER — OFFICE VISIT (OUTPATIENT)
Dept: INTERNAL MEDICINE | Facility: CLINIC | Age: 55
End: 2025-03-27
Payer: COMMERCIAL

## 2025-03-27 VITALS
HEART RATE: 72 BPM | WEIGHT: 185.2 LBS | TEMPERATURE: 97.1 F | BODY MASS INDEX: 25.09 KG/M2 | SYSTOLIC BLOOD PRESSURE: 120 MMHG | DIASTOLIC BLOOD PRESSURE: 82 MMHG | OXYGEN SATURATION: 96 % | HEIGHT: 72 IN

## 2025-03-27 DIAGNOSIS — J01.40 ACUTE NON-RECURRENT PANSINUSITIS: Primary | ICD-10-CM

## 2025-03-27 PROCEDURE — 99213 OFFICE O/P EST LOW 20 MIN: CPT | Performed by: INTERNAL MEDICINE

## 2025-03-27 RX ORDER — DOXYCYCLINE 100 MG/1
100 TABLET ORAL 2 TIMES DAILY
Qty: 14 TABLET | Refills: 0 | Status: SHIPPED | OUTPATIENT
Start: 2025-03-27 | End: 2025-04-03

## 2025-03-27 NOTE — PROGRESS NOTES
Internal Medicine Acute Visit    Chief Complaint   Patient presents with    Facial Pain     Sinus pain onset 3/17/25    Headache     Rt side    Earache     Rt side        HPI  Ms. Garcia comes in today with R sided maxillary and frontal sinus pain, retrorbital pain, R ear pain, and headaches on R side of head. She has a hx of chronic sinusitis and follows with ENT. She is on claritin and singulair on alternating days. She has atrovent nasal spray and has used sinus rinses. She was unable to see ENT for her current sxs. She does not have fever, cough, vision disturbance. She does report some photophobia in both eyes.    Facial Pain  Symptoms include congestion and headaches.    Pertinent negative symptoms include no cough, no fever, no neck pain, no rash and no sore throat.   Headache  Earache   There is pain in the right ear. This is a recurrent problem. The current episode started 1 to 4 weeks ago. The problem occurs constantly. The problem has been worsening. There has been no fever. The fever has been present for Less than 1 day. The pain is at a severity of 10/10. Associated symptoms include headaches. Pertinent negatives include no coughing, drainage, hearing loss, neck pain, rash, rhinorrhea or sore throat. She has tried acetaminophen and cold packs for the symptoms. The treatment provided no relief.   Additional information:Severe sinus pain right side       Review of Systems  Review of Systems   Constitutional:  Negative for fever.   HENT:  Positive for congestion, dental problem (pain), ear pain and sinus pressure. Negative for hearing loss, rhinorrhea, sore throat and tinnitus.    Eyes:  Positive for photophobia. Negative for visual disturbance.   Respiratory:  Negative for cough.    Musculoskeletal:  Negative for neck pain.   Skin:  Negative for rash.   Neurological:  Positive for headache. Negative for dizziness.   Hematological:  Negative for adenopathy.        Medications  Current Outpatient Medications  on File Prior to Visit   Medication Sig Dispense Refill    alendronate (Fosamax) 70 MG tablet Take 1 tablet by mouth Every 7 (Seven) Days. 12 tablet 3    ipratropium (ATROVENT) 0.03 % nasal spray Administer 2 sprays into the nostril(s) as directed by provider Daily As Needed.      loratadine (CLARITIN) 10 MG tablet Take 1 tablet by mouth Every Other Day.      Singulair 10 MG tablet Take 1 tablet by mouth Daily.      spironolactone (ALDACTONE) 50 MG tablet Take 1 tablet by mouth 2 (Two) Times a Day.  1     No current facility-administered medications on file prior to visit.        Allergies  Allergies   Allergen Reactions    Bactrim [Sulfamethoxazole-Trimethoprim] Itching     All over itching, chills, jittery    Erythromycin Other (See Comments)     Stomach pains, rash    Hydrocodone Other (See Comments)    Lorabid [Loracarbef] Other (See Comments)     Stomach pains, rash      Penicillins Other (See Comments)     Allergy unknown, happened as a child       [facility]  Past Medical History:   Diagnosis Date    Abnormal mammogram of left breast 09/10/2021    Allergic     Closed fracture of distal end of radius 09/12/2016    Closed fracture of distal end of tibia 04/25/2016    Closed fracture of styloid process of ulna 09/12/2016    Deep vein thrombosis     Elbow joint pain 09/12/2016    Fracture of wrist     Fractures     Heart murmur     MVP    History of medical problems     Chronic pain right hip    Low back pain Feb 24    May-Thurner syndrome     Migraine     Mitral valve prolapse     Osteopenia     Pain in joint involving ankle and foot 04/06/2016    From Automated Load;Provider: Carlton Muñoz;Status: Active    Pain in left foot 04/06/2016    Sprain of ankle 10/07/2016    Stress fracture of tibia 05/20/2016    Thrombocytopenia 09/09/2019    Vasomotor rhinitis     Visual impairment     History of dry eyes, cornea scratches, staph marginal ulcers       Objective  Visit Vitals  /82 (BP Location: Left arm, Patient  "Position: Sitting)   Pulse 72   Temp 97.1 °F (36.2 °C) (Temporal)   Ht 184.5 cm (72.64\")   Wt 84 kg (185 lb 3.2 oz)   SpO2 96%   BMI 24.68 kg/m²        Physical Exam  Physical Exam  Vitals and nursing note reviewed.   Constitutional:       General: She is not in acute distress.     Appearance: She is well-developed. She is ill-appearing. She is not toxic-appearing.   HENT:      Head: Normocephalic and atraumatic.      Right Ear: Ear canal and external ear normal.      Left Ear: Tympanic membrane, ear canal and external ear normal.      Ears:      Comments: TM with clear effusion, not bulging, erythematous, or perforated.     Nose: Congestion present. No rhinorrhea.      Mouth/Throat:      Mouth: Mucous membranes are moist.      Pharynx: Oropharynx is clear. No oropharyngeal exudate or posterior oropharyngeal erythema.   Eyes:      General:         Right eye: No discharge.         Left eye: No discharge.      Extraocular Movements: Extraocular movements intact.      Conjunctiva/sclera: Conjunctivae normal.   Cardiovascular:      Rate and Rhythm: Normal rate and regular rhythm.      Heart sounds: Normal heart sounds.   Pulmonary:      Effort: Pulmonary effort is normal. No respiratory distress.      Breath sounds: Normal breath sounds.   Lymphadenopathy:      Cervical: No cervical adenopathy.   Skin:     General: Skin is warm and dry.   Neurological:      Mental Status: She is alert and oriented to person, place, and time. Mental status is at baseline.         Results  Results for orders placed or performed in visit on 01/21/25   [facility] GENETIC RISK ASSESSMENT QUESTIONNAIRE - ,    Collection Time: 01/21/25  7:50 AM   Result Value Ref Range    TyrerCuzick 11.1     NCCN NCCN not met         Assessment and Plan  Diagnoses and all orders for this visit:    Acute non-recurrent pansinusitis  - penicillin allergy, will start on doxycycline 100mg BID x7d  - continue singulair, claritin, atrovent nasal spray and sinus rinses " per ENT    Health Maintenance  - Pap smear: UTD, GYN [facility]  - Mammogram: 1/2025 BIRADS 2  - Colonoscopy: 1/2023, due 2033  - HCV: negative  - Immunizations: Tdap 9/2022. Shingrix complete. COVID and Flu declined. Discuss PCV20 at future visit.    Return if symptoms worsen or fail to improve.